# Patient Record
Sex: MALE | Race: WHITE | NOT HISPANIC OR LATINO | Employment: PART TIME | URBAN - METROPOLITAN AREA
[De-identification: names, ages, dates, MRNs, and addresses within clinical notes are randomized per-mention and may not be internally consistent; named-entity substitution may affect disease eponyms.]

---

## 2018-09-17 ENCOUNTER — OFFICE VISIT - RIVER FALLS (OUTPATIENT)
Dept: FAMILY MEDICINE | Facility: CLINIC | Age: 53
End: 2018-09-17

## 2018-09-17 ASSESSMENT — MIFFLIN-ST. JEOR: SCORE: 2117.22

## 2018-09-18 LAB
CHOLEST SERPL-MCNC: 219 MG/DL
CHOLEST/HDLC SERPL: 4.7 {RATIO}
CREAT SERPL-MCNC: 0.95 MG/DL (ref 0.7–1.33)
GLUCOSE BLD-MCNC: 106 MG/DL (ref 65–99)
HBA1C MFR BLD: 5 %
HDLC SERPL-MCNC: 47 MG/DL
LDLC SERPL CALC-MCNC: 141 MG/DL
NONHDLC SERPL-MCNC: 172 MG/DL
PSA SERPL-MCNC: 0.9 NG/ML
TRIGL SERPL-MCNC: 174 MG/DL

## 2018-11-08 ENCOUNTER — OFFICE VISIT - RIVER FALLS (OUTPATIENT)
Dept: FAMILY MEDICINE | Facility: CLINIC | Age: 53
End: 2018-11-08

## 2018-11-08 ASSESSMENT — MIFFLIN-ST. JEOR: SCORE: 2100.89

## 2019-12-30 ENCOUNTER — OFFICE VISIT - RIVER FALLS (OUTPATIENT)
Dept: FAMILY MEDICINE | Facility: CLINIC | Age: 54
End: 2019-12-30

## 2019-12-30 ASSESSMENT — MIFFLIN-ST. JEOR: SCORE: 2062.79

## 2019-12-31 ENCOUNTER — COMMUNICATION - RIVER FALLS (OUTPATIENT)
Dept: FAMILY MEDICINE | Facility: CLINIC | Age: 54
End: 2019-12-31

## 2019-12-31 LAB
CHOLEST SERPL-MCNC: 213 MG/DL
CHOLEST/HDLC SERPL: 3.6 {RATIO}
GLUCOSE BLD-MCNC: 71 MG/DL (ref 65–99)
HDLC SERPL-MCNC: 59 MG/DL
LDLC SERPL CALC-MCNC: 131 MG/DL
NONHDLC SERPL-MCNC: 154 MG/DL
TRIGL SERPL-MCNC: 121 MG/DL

## 2021-04-21 ENCOUNTER — AMBULATORY - RIVER FALLS (OUTPATIENT)
Dept: FAMILY MEDICINE | Facility: CLINIC | Age: 56
End: 2021-04-21

## 2021-05-19 ENCOUNTER — AMBULATORY - RIVER FALLS (OUTPATIENT)
Dept: FAMILY MEDICINE | Facility: CLINIC | Age: 56
End: 2021-05-19

## 2021-07-01 ENCOUNTER — OFFICE VISIT - RIVER FALLS (OUTPATIENT)
Dept: FAMILY MEDICINE | Facility: CLINIC | Age: 56
End: 2021-07-01

## 2021-07-01 ASSESSMENT — MIFFLIN-ST. JEOR: SCORE: 2165.76

## 2021-09-09 ENCOUNTER — OFFICE VISIT - RIVER FALLS (OUTPATIENT)
Dept: FAMILY MEDICINE | Facility: CLINIC | Age: 56
End: 2021-09-09

## 2021-09-09 ASSESSMENT — MIFFLIN-ST. JEOR: SCORE: 2149.88

## 2021-11-01 ENCOUNTER — OFFICE VISIT - RIVER FALLS (OUTPATIENT)
Dept: FAMILY MEDICINE | Facility: CLINIC | Age: 56
End: 2021-11-01

## 2021-11-01 ASSESSMENT — MIFFLIN-ST. JEOR: SCORE: 2163.49

## 2021-11-11 ENCOUNTER — OFFICE VISIT - RIVER FALLS (OUTPATIENT)
Dept: FAMILY MEDICINE | Facility: CLINIC | Age: 56
End: 2021-11-11

## 2021-11-11 ASSESSMENT — MIFFLIN-ST. JEOR: SCORE: 2171.66

## 2021-11-12 ENCOUNTER — COMMUNICATION - RIVER FALLS (OUTPATIENT)
Dept: FAMILY MEDICINE | Facility: CLINIC | Age: 56
End: 2021-11-12

## 2021-11-12 LAB
ERYTHROCYTE [SEDIMENTATION RATE] IN BLOOD BY WESTERGREN METHOD: 6 MM/H
URATE SERPL-MCNC: 6.1 MG/DL (ref 4–8)

## 2021-11-18 ENCOUNTER — AMBULATORY - RIVER FALLS (OUTPATIENT)
Dept: FAMILY MEDICINE | Facility: CLINIC | Age: 56
End: 2021-11-18

## 2022-02-11 VITALS
BODY MASS INDEX: 40.4 KG/M2 | HEART RATE: 72 BPM | SYSTOLIC BLOOD PRESSURE: 120 MMHG | HEIGHT: 69 IN | DIASTOLIC BLOOD PRESSURE: 70 MMHG | WEIGHT: 272.8 LBS | TEMPERATURE: 97.3 F

## 2022-02-11 VITALS
BODY MASS INDEX: 43.25 KG/M2 | SYSTOLIC BLOOD PRESSURE: 135 MMHG | WEIGHT: 292 LBS | HEART RATE: 60 BPM | DIASTOLIC BLOOD PRESSURE: 92 MMHG | BODY MASS INDEX: 43.69 KG/M2 | HEIGHT: 69 IN | SYSTOLIC BLOOD PRESSURE: 134 MMHG | TEMPERATURE: 97 F | HEIGHT: 69 IN | WEIGHT: 295 LBS | DIASTOLIC BLOOD PRESSURE: 89 MMHG | HEART RATE: 67 BPM

## 2022-02-11 VITALS
TEMPERATURE: 97.4 F | DIASTOLIC BLOOD PRESSURE: 88 MMHG | HEIGHT: 69 IN | HEIGHT: 69 IN | HEART RATE: 64 BPM | WEIGHT: 284.8 LBS | TEMPERATURE: 96.8 F | SYSTOLIC BLOOD PRESSURE: 122 MMHG | SYSTOLIC BLOOD PRESSURE: 136 MMHG | DIASTOLIC BLOOD PRESSURE: 88 MMHG | BODY MASS INDEX: 41.65 KG/M2 | HEART RATE: 76 BPM | WEIGHT: 281.2 LBS | BODY MASS INDEX: 42.18 KG/M2

## 2022-02-11 VITALS
SYSTOLIC BLOOD PRESSURE: 129 MMHG | HEIGHT: 69 IN | BODY MASS INDEX: 43.96 KG/M2 | HEART RATE: 68 BPM | TEMPERATURE: 96.9 F | WEIGHT: 296.8 LBS | DIASTOLIC BLOOD PRESSURE: 79 MMHG

## 2022-02-11 VITALS
SYSTOLIC BLOOD PRESSURE: 127 MMHG | HEIGHT: 69 IN | BODY MASS INDEX: 43.77 KG/M2 | HEART RATE: 60 BPM | WEIGHT: 295.5 LBS | DIASTOLIC BLOOD PRESSURE: 83 MMHG | OXYGEN SATURATION: 95 % | TEMPERATURE: 97.7 F

## 2022-02-16 NOTE — PROGRESS NOTES
Patient:   PRAVEEN GRACE            MRN: [id number]            FIN: [id number]               Age:   55 years     Sex:  Male     :  1965   Associated Diagnoses:   None   Author:   Michelle Braswell MA       -   Today's date:    2021.        -   To whom it may concern:        This patient Was seen in my office on  2021.  .  He/ she may return to work, without restrictions.  Please contact me if you have any questions or concerns.      -   Sincerely,   Dr. Varma    [address] [address]    [phone]

## 2022-02-16 NOTE — PROGRESS NOTES
Chief Complaint    annual px.  History of Present Illness      Patient is here for his annual physical.  The last time he was seen, he had an infection in his leg that was treated with Cephalexin, which has now resolved.  He does report having some back pain but attributes that from weight gain.  He had a colonoscopy last year and was found to have a polyp, needs surveillance colonoscopy in 5 years.  His last lipid panel done was in 9/17/18, with his cholesterol, LDL and triglycerides being elevated.  His 10 year risk of having a heart event is at 4.9%.  His mother passed away from MI, due to heavy smoking.  His father passed away from pancreatic cancer.  He does report of having bad eating habits and gaining weight since November.  His lowest weight since his last physical was at 259.  His weight today is 272.8.  He is fasting today.      Weight one year ago 281.2lbs.  Today s weight 272.8lbs.      Colon cancer screening status:  [facility]--due 2023      Last Dental Exam:  UTD      Last Eye Exam:  UTD      Immunizations:  UTD         Review of Systems      Constitutional: Negative.      Eye: Negative.      Ear/Nose/Mouth/Throat: Negative.      Respiratory: Negative.      Cardiovascular: Negative.      Gastrointestinal: Negative.      Genitourinary: Negative.      Hematology/Lymphatics: Negative.      Endocrine: Negative.      Immunologic: Negative.      Musculoskeletal: Negative.      Integumentary: Negative.      Neurologic: Negative.      Psychiatric: Negative.      All other systems reviewed and negative         Physical Exam   Vitals & Measurements    T: 97.3   F (Tympanic)  HR: 72(Peripheral)  BP: 120/70     HT: 69 in  WT: 272.8 lb  BMI: 40.28       General: Alert and oriented, No acute distress.      Eye: Pupils are equal, round and reactive to light, Extraocular movements are intact, Normal conjunctiva.      HENT: Normocephalic, Tympanic membranes are clear, Oral mucosa is moist, No pharyngeal erythema, No sinus  tenderness.      Neck: Supple, Non-tender, No carotid bruit, No lymphadenopathy, No thyromegaly.      Respiratory: Lungs are clear to auscultation, Respirations are non-labored, Breath sounds are equal, No chest wall tenderness.      Cardiovascular: Normal rate, Regular rhythm, No murmur, No gallop, Good pulses equal in all extremities, Normal peripheral perfusion, No edema.      Gastrointestinal: Soft, Non-tender, Non-distended, Normal bowel sounds, No organomegaly.      Genitourinary: No CVA tenderness      Lymphatics: WNL.      Musculoskeletal: Normal range of motion, Normal strength, No tenderness, No swelling, No deformity.      Integumentary: Warm, Dry, No rash.      Neurologic: Alert, Oriented, Normal sensory, Normal motor function, No focal deficits.      Psychiatric: Cooperative, Appropriate mood & affect.         Assessment/Plan       1. Encounter for annual physical exam (Z00.00)         Discussed improving diet, increasing exercise/activity level to 30 minutes daily, preventative services, immunizations.  RTC 1 year for annual physical.       2. Hyperlipidemia (E78.5)         Fasting lipid panel, glucose today.       3. MORBID OBESITY (E66.01)         Work on improving diet and increasing activity level.       Orders:         Glucose* ([facility]), Specimen Type: Serum, Collection Date: 12/30/19 15:26:00 CST         Lipid panel with reflex to direct ldl* ([facility]), Specimen Type: Serum, Collection Date: 12/30/19 15:26:00 CST         Return to Clinic (Request), RFV: Yearly exam/physical, Return in 1 year      IMichelle MA, acted solely as a scribe for, and in the presence of Dr. Topher Varma who performed the services.             I, Topher Varma MD, personally performed the services described in this documentation.  The documentation was scribed in my presence and is both accurate and complete.                Patient Information     Name:PRAVEEN GRACE      Address:       [place] [address]       [place], [place] [id number]     Sex:Male     YOB: 1965     [phone]     Emergency Contact:DECLINED, UNKNOWN     MRN:[id number]     FIN:[id number]     Location:[facility]     Date of Service:12/30/2019      Primary Care Physician:       NONE ,       Attending Physician:       Topher Varma MD, [phone]  Problem List/Past Medical History    Ongoing     Asthma     Hyperlipidemia     MORBID OBESITY     Sleep apnea    Historical     No qualifying data  Procedure/Surgical History     Flexible sigmoidoscopy (05/02/2000)        Medications    ZyrTEC 10 mg oral tablet, 10 mg= 1 tab(s), Oral, daily  Allergies    No known allergies  Social History    Smoking Status - 12/30/2019     Never smoker     Employment/School      Employed, 05/28/2013     Home/Environment      Marital status: ., 05/28/2013     Tobacco      Never smoker, 06/20/2016  Family History    Cancer: Father.    Chronic obstructive pulmonary disease: Mother and Father.    Tobacco user: Mother.  Immunizations      Vaccine Date Status          influenza virus vaccine, inactivated 11/19/2019 Recorded          influenza virus vaccine, inactivated 10/25/2018 Recorded          influenza 12/30/2017 Recorded          influenza virus vaccine, inactivated 10/16/2014 Recorded          tetanus/diphth/pertuss (Tdap) adult/adol 10/14/2013 Given          MMR (measles/mumps/rubella) 10/19/2002 Recorded          Td 03/24/2000 Recorded

## 2022-02-16 NOTE — LETTER
(Inserted Image. Unable to display)   September 18, 2019        PRAVEEN GRACE   [address] [place], [place] [id number]        Dear PRAVEEN,      Thank you for selecting [facility] (previously [facility]) for your healthcare needs.    Our records indicate you are due for the following services:     Annual Physical    To schedule an appointment or if you have further questions, please contact your primary clinic:   [facility]       [phone]   [facility]       [phone]              [facility]     [phone]      Powered by [employer]    Sincerely,    Topher Varma M.D.

## 2022-02-16 NOTE — LETTER
(Inserted Image. Unable to display)   December 31, 2019        PRAVEEN GRACE       [address] [place], [place] [id number]        Dear PRAVEEN,    Thank you for selecting [facility] for your health care needs.  Below you will find the results of your recent test(s) done at our clinic.     Your tests look good.  A heart healthy diet, increased activity and weight loss should improve the LDL.      Result Name Current Result Previous Result Reference Range   Glucose Level (mg/dL)  71 12/30/2019 ((H)) 106 9/17/2018 65 - 99   Cholesterol (mg/dL) ((H)) 213 12/30/2019 ((H)) 219 9/17/2018  - <200   Non-HDL Cholesterol ((H)) 154 12/30/2019 ((H)) 172 9/17/2018  - <130   HDL (mg/dL)  59 12/30/2019  47 9/17/2018 >40 -    Cholesterol/HDL Ratio  3.6 12/30/2019  4.7 9/17/2018  - <5.0   LDL ((H)) 131 12/30/2019 ((H)) 141 9/17/2018    Triglyceride (mg/dL)  121 12/30/2019 ((H)) 174 9/17/2018  - <150       Please contact me or my assistant at [phone] if you have any questions about your results.    Sincerely,        Salty Varma MD        What do your labs mean?  Below is a glossary of commonly ordered labs:  LDL   Bad Cholesterol   HDL   Good Cholesterol  AST/ALT   Liver Function   Cr/Creatinine   Kidney Function  Microalbumin   Kidney Function  BUN   Kidney Function  PSA   Prostate    TSH   Thyroid Hormone  HgbA1c   Diabetes Test   Hgb (Hemoglobin)   Red Blood Cells

## 2022-02-16 NOTE — NURSING NOTE
Vitals/Measurements Entered On:  11/1/2021 3:47 PM CDT    Performed On:  11/1/2021 3:47 PM CDT by Mara Thomson LPN               Vital Signs   Systolic Blood Pressure :   134 mmHg (HI)    Diastolic Blood Pressure :   89 mmHg (HI)    Mean Arterial Pressure :   104 mmHg   BP Site :   Right arm   Mara Thomson LPN - 11/1/2021 3:47 PM CDT

## 2022-02-16 NOTE — NURSING NOTE
Comprehensive Intake Entered On:  11/1/2021 3:13 PM CDT    Performed On:  11/1/2021 3:06 PM CDT by Mara Thomson LPN               Summary   Chief Complaint :   follow up right foot pain, did have some improvement after treatment. check lesion on left hand   Weight Measured :   295 lb(Converted to: 295 lb 0 oz, 133.810 kg)    Height Measured :   69 in(Converted to: 5 ft 9 in, 175.26 cm)    Body Mass Index :   43.56 kg/m2 (HI)    Body Surface Area :   2.55 m2   Systolic Blood Pressure :   152 mmHg (HI)    Diastolic Blood Pressure :   92 mmHg (HI)    Mean Arterial Pressure :   112 mmHg   Peripheral Pulse Rate :   60 bpm   BP Site :   Right arm   Pulse Site :   Radial artery   BP Method :   Electronic   HR Method :   Electronic   Languages :   English   Mara Thomson LPN - 11/1/2021 3:06 PM CDT   Health Status   Allergies Verified? :   Yes   Medication History Verified? :   Yes   Pre-Visit Planning Status :   Completed   Tobacco Use? :   Never smoker   Mara Thomson LPN - 11/1/2021 3:06 PM CDT   Consents   Consent for Immunization Exchange :   Consent Granted   Consent for Immunizations to Providers :   Consent Granted   Mara Thomson LPN - 11/1/2021 3:06 PM CDT   Meds / Allergies   (As Of: 11/1/2021 3:13:48 PM CDT)   Allergies (Active)   No known allergies  Estimated Onset Date:   Unspecified ; Created By:   Mally Perkins CMA; Reaction Status:   Active ; Category:   Drug ; Substance:   No known allergies ; Type:   Allergy ; Updated By:   Mally Perkins CMA; Source:   Paper Chart ; Reviewed Date:   11/1/2021 3:12 PM CDT        Medication List   (As Of: 11/1/2021 3:13:48 PM CDT)   Prescription/Discharge Order    tadalafil  :   tadalafil ; Status:   Prescribed ; Ordered As Mnemonic:   tadalafil 20 mg oral tablet ; Simple Display Line:   20 mg, 1 tab(s), Oral, As Directed, 5 tab(s), 0 Refill(s) ; Ordering Provider:   Topher Varma MD; Catalog Code:   tadalafil ; Order Dt/Tm:    9/9/2021 3:36:10 PM CDT            Home Meds    cetirizine  :   cetirizine ; Status:   Documented ; Ordered As Mnemonic:   ZyrTEC 10 mg oral tablet ; Simple Display Line:   10 mg, 1 tab(s), Oral, daily, 0 Refill(s) ; Catalog Code:   cetirizine ; Order Dt/Tm:   12/30/2019 3:03:18 PM CST

## 2022-02-16 NOTE — PROGRESS NOTES
Patient:   PRAVEEN GRACE            MRN: [id number]            FIN: [id number]               Age:   55 years     Sex:  Male     :  1965   Associated Diagnoses:   None   Author:   Michelle Braswell MA       -   Today's date:    2021.        -   To whom it may concern:        This patient Was seen in my office on  2021.  .     Please excuse him/ her from work, today, due to injury.  He/ she may return to work, on  2021, or may return to work 2021, depending severity of his leg pain.  Please contact me if you have any questions or concerns.      -   Sincerely,   Dr. Varma    [facility] [address]    [phone]

## 2022-02-16 NOTE — PROGRESS NOTES
Chief Complaint    c/o left ankle pain for past couple weeks.  denies injury  History of Present Illness       Patient here with 10 days of intermittent left ankle pain. He denies injury. Pain has become more persistent in the last 48 h. He denies fever, chills, sweats. Is also noticed his foot is a bit swollen.  Review of Systems       See HPI.  All other review of systems negative.  Physical Exam   Vitals & Measurements    T: 96.9  F (Tympanic)  HR: 68 (Peripheral)  BP: 129/79     HT: 69 in  WT: 296.8 lb  BMI: 43.82        Alert, oriented, no acute distress       Normal heart rate       Nonlabored breathing       Exam of the left ankle reveals mild warmth, mild swelling and redness. He has decreased range of motion due to pain.  Assessment/Plan       1. Left ankle pain (M25.572)          Left ankle pain suspicious for gout although it is not as warm and red as well expect.         Treat his inflammatory arthritis with prednisone and obtain ESR and uric acid.         Ordered:          predniSONE, = 2 tab(s) ( 40 mg ), Oral, daily, x 7 day(s), # 14 tab(s), 0 Refill(s), Type: Acute, Pharmacy: [id number], 2 tab(s) Oral daily,x7 day(s), 69, in, 11/11/21 12:07:00 CST, Height Measured, 296.8, lb, 11/11/21 12:07:00 CST, Weight Measured, (Ordered)          Sed rate by modified westergren* ([facility]), Specimen Type: Blood, Collection Date: 11/11/21 12:36:00 CST          Uric Acid* ([facility]), Specimen Type: Serum, Collection Date: 11/11/21 12:36:00 CST           Patient Information     Name:PRAVEEN GRACE      Address:      [address] [id number]     Sex:Male     YOB: 1965     [phone]     Emergency Contact:NANCIE CONNOR     MRN:[id number]     FIN:[id number]     Location:[facility]     Date of Service:11/11/2021      Primary Care Physician:       Topher Varma MD, [phone]      Attending Physician:       Topher Varma MD, [phone]  Problem  List/Past Medical History    Ongoing     Asthma     Hyperlipidemia     MORBID OBESITY     Sleep apnea    Historical     No qualifying data  Procedure/Surgical History     Flexible sigmoidoscopy (05/02/2000)  Medications    predniSONE 20 mg oral tablet, 40 mg= 2 tab(s), Oral, daily    tadalafil 20 mg oral tablet, 20 mg= 1 tab(s), Oral, As Directed, 3 refills    ZyrTEC 10 mg oral tablet, 10 mg= 1 tab(s), Oral, daily  Allergies    No known allergies  Social History    Smoking Status     Never smoker     Alcohol      Current, Wine (5 oz), 1-2 times per month     Electronic Cigarette/Vaping      Electronic Cigarette Use: Never.     Employment/School      Employed, Work/School description: Sales.     Exercise      Exercise frequency: 1-2 times/week. Exercise type: Walking.     Home/Environment      Marital status: .     Nutrition/Health      Type of diet: Regular. Wants to lose weight: Yes.     Substance Abuse      Never     Tobacco      Never (less than 100 in lifetime)  Family History    Cancer: Father.    Chronic obstructive pulmonary disease: Mother and Father.    Tobacco user: Mother.  Immunizations          Scheduled Immunizations          Dose Date(s)          influenza          10/14/2010, 09/16/2011, 10/17/2012          influenza virus vaccine, inactivated          10/16/2014, 10/25/2018, 11/19/2019, 09/15/2020, 10/18/2021          Other Immunizations          influenza          12/30/2017          MMR (measles/mumps/rubella)          10/19/2002          Td          03/24/2000          tetanus/diphth/pertuss (Tdap) adult/adol          10/14/2013          SARS-CoV-2 (COVID-19) Moderna-1273          04/21/2021, 05/19/2021

## 2022-02-16 NOTE — TELEPHONE ENCOUNTER
"---------------------  From: Eva Apple CMA   Sent: 7/1/2021 10:20:33 AM CDT  Subject: leg injury     Pt calling at 1015.    Playing softball last night and injured leg while running. Swollen by knee. Difficulty moving around and is \"hobbling\". Ibuprofen at bedtime, has not iced today yet. Injury to leg 4 years ago - fell through attic floor into basement. Advised appt in clinic and pt agreed - transferred to scheduling.  "

## 2022-02-16 NOTE — NURSING NOTE
Comprehensive Intake Entered On:  12/30/2019 3:05 PM CST    Performed On:  12/30/2019 2:58 PM CST by French ARAUJO, Michelle               Summary   Chief Complaint :   annual px.   Weight Measured :   272.8 lb(Converted to: 272 lb 13 oz, 123.74 kg)    Height Measured :   69 in(Converted to: 5 ft 9 in, 175.26 cm)    Body Mass Index :   40.28 kg/m2 (HI)    Body Surface Area :   2.45 m2   Systolic Blood Pressure :   120 mmHg   Diastolic Blood Pressure :   70 mmHg   Mean Arterial Pressure :   87 mmHg   Peripheral Pulse Rate :   72 bpm   BP Site :   Right arm   Pulse Site :   Radial artery   BP Method :   Manual   HR Method :   Manual   Temperature Tympanic :   97.3 DegF(Converted to: 36.3 DegC)  (LOW)    Languages :   English   Michelle Braswell MA - 12/30/2019 2:58 PM CST   Health Status   Allergies Verified? :   Yes   Medication History Verified? :   Yes   Medical History Verified? :   Yes   Pre-Visit Planning Status :   Completed   Tobacco Use? :   Never smoker   Michelle Braswell MA - 12/30/2019 2:58 PM CST   Consents   Consent for Immunization Exchange :   Consent Granted   Consent for Immunizations to Providers :   Consent Granted   Michelle Braswell MA - 12/30/2019 2:58 PM CST   Meds / Allergies   (As Of: 12/30/2019 3:05:34 PM CST)   Allergies (Active)   No known allergies  Estimated Onset Date:   Unspecified ; Created By:   Mally Giang; Reaction Status:   Active ; Category:   Drug ; Substance:   No known allergies ; Type:   Allergy ; Updated By:   Mally Giang; Source:   Paper Chart ; Reviewed Date:   11/8/2018 1:35 PM CST        Medication List   (As Of: 12/30/2019 3:05:34 PM CST)   Prescription/Discharge Order    cephalexin  :   cephalexin ; Status:   Completed ; Ordered As Mnemonic:   cephalexin 500 mg oral tablet ; Simple Display Line:   1,000 mg, 2 tab(s), PO, bid, for 7 day(s), 28 tab(s), 0 Refill(s) ; Ordering Provider:   Topher Varma MD; Catalog Code:   cephalexin ; Order Dt/Tm:    11/8/2018 2:15:40 PM CST            Home Meds    cetirizine  :   cetirizine ; Status:   Documented ; Ordered As Mnemonic:   ZyrTEC 10 mg oral tablet ; Simple Display Line:   10 mg, 1 tab(s), Oral, daily, 0 Refill(s) ; Catalog Code:   cetirizine ; Order Dt/Tm:   12/30/2019 3:03:18 PM CST            Social History   Social History   (As Of: 12/30/2019 3:05:34 PM CST)   Tobacco:        Never smoker   (Last Updated: 6/20/2016 11:24:06 AM CDT by Gavin Shaw CMA)          Employment/School:        Employed   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   (Last Updated: 5/28/2013 11:00:14 AM CDT by Yenny Stevens)

## 2022-02-16 NOTE — PROGRESS NOTES
Chief Complaint    c/o outer right foot pain x2mos, no relief w/ soaking, is on feet all day long.  also c/o left knee pain--ongoing  History of Present Illness       Patient is here with a painful area       Is right lateral distal foot.  This is in the plantar surface.  He denies any injury.  This is painful when he walks.  He also has continued discomfort in his left lateral knee.  Earlier in the summer he was diagnosed with a strain of the biceps femoris.  Rest and activity restrictions advised.  He noted the improvement in the discomfort but continues to have some nagging pain.  Review of Systems       See HPI.  All other review of systems negative.  Physical Exam   Vitals & Measurements    T: 97  F (Tympanic)  HR: 67 (Peripheral)  BP: 135/92     HT: 69 in  WT: 292 lb  BMI: 43.12        Alert, oriented, no acute distress       Normal heart rate       Nonlabored breathing       Tenderness at the insertion of the left biceps femoris tendon, normal knee range of motion, normal strength       2 mm coring type lesion present under the right fifth metatarsal head  Assessment/Plan       1. Corn of foot (L84)          Advise appropriate shoe wear, metatarsal pad, and pressure relief.  There was cleansed with alcohol and corn trimmed and treated with liquid nitrogen.  He will follow-up as needed       2. Hamstring tendinitis (M76.899)          Continued trouble with hamstring tendon.  There are discussed strengthening and stretching exercises.  If there is no improvement over the next few weeks reconsider physical therapy.       Orders:         tadalafil, = 1 tab(s) ( 20 mg ), Oral, As Directed, # 5 tab(s), 0 Refill(s), Type: Maintenance, (Ordered)  Patient Information     Name:PRAVEEN GRACE      Address:      [address] [id number]     Sex:Male     YOB: 1965     [phone]     Emergency Contact:DECLINED, UNKNOWN     MRN:[id number]     FIN:[id number]     Location:[facility]  [place]     Date of Service:09/09/2021      Primary Care Physician:       NONE ,       Attending Physician:       Topher Varma MD, [phone]  Problem List/Past Medical History    Ongoing     Asthma     Hyperlipidemia     MORBID OBESITY     Sleep apnea    Historical     No qualifying data  Procedure/Surgical History     Flexible sigmoidoscopy (05/02/2000)  Medications    tadalafil 20 mg oral tablet, 20 mg= 1 tab(s), Oral, As Directed    ZyrTEC 10 mg oral tablet, 10 mg= 1 tab(s), Oral, daily  Allergies    No known allergies  Social History    Smoking Status     Never smoker     Alcohol      Current, Wine (5 oz), 1-2 times per month     Electronic Cigarette/Vaping      Electronic Cigarette Use: Never.     Employment/School      Employed, Work/School description: Sales.     Exercise      Exercise frequency: 1-2 times/week. Exercise type: Walking.     Home/Environment      Marital status: .     Nutrition/Health      Type of diet: Regular. Wants to lose weight: Yes.     Substance Abuse      Never     Tobacco      Never (less than 100 in lifetime)  Family History    Cancer: Father.    Chronic obstructive pulmonary disease: Mother and Father.    Tobacco user: Mother.  Immunizations          Scheduled Immunizations          Dose Date(s)          influenza virus vaccine, inactivated          10/16/2014, 10/25/2018, 11/19/2019, 09/15/2020          Other Immunizations          influenza          12/30/2017          MMR (measles/mumps/rubella)          10/19/2002          Td          03/24/2000          tetanus/diphth/pertuss (Tdap) adult/adol          10/14/2013          SARS-CoV-2 (COVID-19) Moderna-1273          04/21/2021, 05/19/2021

## 2022-02-16 NOTE — PROGRESS NOTES
Chief Complaint        establish care, discuss colonoscopy, fasting labs. is fasting.      History of Present Illness          Patient is here to establish care and to discuss preventative care maintenance.  He is fasting and would like to have fasting labs done as well as discuss getting set up for a colonoscopy/CRC screening.  He has been seeing the surgeon for an ongoing leg infection received from a dennys accident for the past couple of months and received a reminder for preventative care services that are due on his visit summary from [facility].  He did have drains placed, which have since been removed.  He doesn't take any regular medications.   He does have past history of asthma and sleep apnea.  His asthma has improved and he no longer uses his CPAP machine but uses nasal strips, which seems to be helping.  Denies daytime sleepiness.  Dental visits UTD, immunizations UTD.      Review of Systems          Constitutional: Negative, obese.          Eye: Negative.          Ear/Nose/Mouth/Throat: Negative.          Respiratory: Negative.          Cardiovascular: Negative.          Gastrointestinal: Negative.          Genitourinary: Negative.          Hematology/Lymphatics: Negative.          Endocrine: Negative.          Immunologic: Negative.          Musculoskeletal: Wounds to left leg.          Integumentary: Negative.          Neurologic: Negative.          Psychiatric: Negative.          All other systems reviewed and negative                 Physical Exam       Vitals & Measurements        T: 97.4   F (Tympanic)  HR: 76(Peripheral)  BP: 138/90         HT: 69 in  WT: 284.8 lb  BMI: 42.05           General: Alert and oriented, No acute distress.          Eye: Pupils are equal, round and reactive to light, Extraocular movements are intact, Normal conjunctiva.          HENT: Normocephalic, Tympanic membranes are clear, Oral mucosa is moist, No pharyngeal erythema, No sinus tenderness.          Neck: Supple,  Non-tender, No carotid bruit, No lymphadenopathy, No thyromegaly.          Respiratory: Lungs are clear to auscultation, Respirations are non-labored, Breath sounds are equal, No chest wall tenderness.          Cardiovascular: Normal rate, Regular rhythm, No murmur, No gallop, Good pulses equal in all extremities, Normal peripheral perfusion, No edema.          Gastrointestinal: Soft, Non-tender, Non-distended, Normal bowel sounds, No organomegaly.          Musculoskeletal: Normal range of motion, Normal strength, No tenderness, No swelling, No deformity.  Healing left leg wounds.          Integumentary: Warm, Dry, No rash.          Neurologic: Alert, Oriented, Normal sensory, Normal motor function, No focal deficits.          Psychiatric: Cooperative, Appropriate mood & affect.                       EKG findings           Rhythm: heart rate  60  beats/min, sinus normal.             Axis: normal axis, normal configuration.             Intervals: normal.             P waves: normal.             QRS complex: normal.             ST-T-U complex: normal.             Interpretation: normal EKG.            [1]      Assessment/Plan           1. Screening for colon cancer (Z12.11)             Discussed CRC screening/colonoscopy.  He will need to have a surgeon do the colonoscopy due to co-morbalities that the pt has--BMI>42, hx of KATE.  ASA form filled out.  An EKG will be done prior to pt going to lab.           2. Encounter for screening and preventative care (Z00.00)            Continue to lose weight and improve diet.  Patient sent to lab for BMP, hgb a1c, lipid panel and PSA.  Will send out results when available.  He will RTC in 1yr for routine exam, sooner depending on results.            Michelle AHUMADA MA, acted solely as a scribe for, and in the presence of Dr. Topher Varma who performed the services.                         Topher AHUMADA MD, personally performed the services described in this  documentation.  The documentation was scribed in my presence and is both accurate and complete.      Patient Information         Name:PRAVEEN GRACE          Address:          [address] [address]         Sex:Male         YOB: 1965         [phone]         Emergency Contact:DECLINED, UNKNOWN         MRN:[id number]         FIN:[id number]         Location:[facility]         Date of Service:09/17/2018          Primary Care Physician:           Benny Vargas PA-C, [phone]          Attending Physician:           Topher Varma MD, [phone]      Problem List/Past Medical History        Ongoing         Asthma         Hyperlipidemia         MORBID OBESITY         Sleep apnea        Historical         No qualifying data      Procedure/Surgical History         Flexible sigmoidoscopy (05/02/2000)                Medications         No Recorded Medications              Allergies        No known allergies      Social History        Smoking Status - 09/17/2018         Never smoker         Employment and Education          Employed, 05/28/2013         Home and Environment          Marital status: ., 05/28/2013         Tobacco          Never smoker, 06/20/2016      Family History        Chronic obstructive pulmonary disease: Mother and Father.      Immunizations          Vaccine Date Status          influenza 12/30/2017 Recorded          tetanus/diphth/pertuss (Tdap) adult/adol 10/14/2013 Given          MMR (measles/mumps/rubella) 10/19/2002 Recorded          Td 03/24/2000 Recorded  [1] EKG Procedure *; Topher Varma MD 09/17/2018 09:38 CDT

## 2022-02-16 NOTE — LETTER
(Inserted Image. Unable to display)   [address] [place]  [address]  November 12, 2021                      PRAVEEN GRACE       [address] [place] [address]        Dear PRAVEEN,    Thank you for selecting [facility] for your health care needs.  Below you will find the results of your recent test(s) done at our clinic.     Your tests were normal.  It does not look like gout.      Result Name Current Result Reference Range   Uric Acid (mg/dL)  6.1 11/11/2021 4.0 - 8.0   Sed Rate  6 11/11/2021  - < OR = 20       Please contact me or my assistant at [phone] if you have any questions about your results.    Sincerely,        Salty Varma MD        What do your labs mean?  Below is a glossary of commonly ordered labs:  LDL   Bad Cholesterol   HDL   Good Cholesterol  AST/ALT   Liver Function   Cr/Creatinine   Kidney Function  Microalbumin   Kidney Function  BUN   Kidney Function  PSA   Prostate    TSH   Thyroid Hormone  HgbA1c   Diabetes Test   Hgb (Hemoglobin)   Red Blood Cells

## 2022-02-16 NOTE — NURSING NOTE
Comprehensive Intake Entered On:  9/9/2021 2:53 PM CDT    Performed On:  9/9/2021 2:48 PM CDT by French ARAUJO, Michelle               Summary   Chief Complaint :   c/o outer right foot pain x2mos, no relief w/ soaking, is on feet all day long.  also c/o left knee pain--ongoing   Weight Measured :   292 lb(Converted to: 292 lb 0 oz, 132.449 kg)    Height Measured :   69 in(Converted to: 5 ft 9 in, 175.26 cm)    Body Mass Index :   43.12 kg/m2 (HI)    Body Surface Area :   2.54 m2   Systolic Blood Pressure :   135 mmHg (HI)    Diastolic Blood Pressure :   92 mmHg (HI)    Mean Arterial Pressure :   106 mmHg   Peripheral Pulse Rate :   67 bpm   BP Site :   Right arm   Pulse Site :   Radial artery   BP Method :   Electronic   HR Method :   Electronic   Temperature Tympanic :   97 DegF(Converted to: 36.1 DegC)  (LOW)    Languages :   English   Michelle Braswell MA - 9/9/2021 2:48 PM CDT   Health Status   Allergies Verified? :   Yes   Medication History Verified? :   Yes   Medical History Verified? :   Yes   Pre-Visit Planning Status :   Completed   Tobacco Use? :   Never smoker   Michelle Braswell MA - 9/9/2021 2:48 PM CDT   Consents   Consent for Immunization Exchange :   Consent Granted   Consent for Immunizations to Providers :   Consent Granted   Michelle Braswell MA - 9/9/2021 2:48 PM CDT   Meds / Allergies   (As Of: 9/9/2021 2:53:26 PM CDT)   Allergies (Active)   No known allergies  Estimated Onset Date:   Unspecified ; Created By:   Mally Perkins CMA; Reaction Status:   Active ; Category:   Drug ; Substance:   No known allergies ; Type:   Allergy ; Updated By:   Mally Perkins CMA; Source:   Paper Chart ; Reviewed Date:   11/8/2018 1:35 PM CST        Medication List   (As Of: 9/9/2021 2:53:26 PM CDT)   Home Meds    cetirizine  :   cetirizine ; Status:   Documented ; Ordered As Mnemonic:   ZyrTEC 10 mg oral tablet ; Simple Display Line:   10 mg, 1 tab(s), Oral, daily, 0 Refill(s) ; Catalog Code:   cetirizine ;  Order Dt/Tm:   12/30/2019 3:03:18 PM CST            Social History   Social History   (As Of: 9/9/2021 2:53:26 PM CDT)   Alcohol:        Current, Wine (5 oz), 1-2 times per month   (Last Updated: 12/30/2019 5:22:00 PM CST by Michelle Braswell MA)          Tobacco:        Never (less than 100 in lifetime)   (Last Updated: 7/1/2021 4:31:48 PM CDT by Michelle Braswell MA)          Electronic Cigarette/Vaping:        Electronic Cigarette Use: Never.   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   (Last Updated: 5/28/2013 11:00:14 AM CDT by Yenny Stevens)          Nutrition/Health:        Type of diet: Regular.  Wants to lose weight: Yes.   (Last Updated: 12/30/2019 5:22:53 PM CST by Michelle Braswell MA)          Exercise:        Exercise frequency: 1-2 times/week.  Exercise type: Walking.   (Last Updated: 12/30/2019 5:23:06 PM CST by Michelle Braswell MA)

## 2022-02-16 NOTE — NURSING NOTE
Comprehensive Intake Entered On:  7/1/2021 4:39 PM CDT    Performed On:  7/1/2021 4:36 PM CDT by French ARAUJO, Michelle               Summary   Chief Complaint :   c/o LLE injury last evening while playing softball.   c/o increased swelling/redness.   Weight Measured :   295.5 lb(Converted to: 295 lb 8 oz, 134.037 kg)    Height Measured :   69 in(Converted to: 5 ft 9 in, 175.26 cm)    Body Mass Index :   43.63 kg/m2 (HI)    Body Surface Area :   2.55 m2   Systolic Blood Pressure :   127 mmHg   Diastolic Blood Pressure :   83 mmHg (HI)    Mean Arterial Pressure :   98 mmHg   Peripheral Pulse Rate :   60 bpm   BP Site :   Right arm   Pulse Site :   Radial artery   BP Method :   Electronic   HR Method :   Electronic   Temperature Tympanic :   97.7 DegF(Converted to: 36.5 DegC)  (LOW)    Oxygen Saturation :   95 %   Languages :   English   Michelle Braswell MA - 7/1/2021 4:36 PM CDT   Health Status   Allergies Verified? :   Yes   Medication History Verified? :   Yes   Medical History Verified? :   Yes   Pre-Visit Planning Status :   Not completed   Tobacco Use? :   Never smoker   Michelle Braswell MA - 7/1/2021 4:36 PM CDT   Consents   Consent for Immunization Exchange :   Consent Granted   Consent for Immunizations to Providers :   Consent Granted   Michelle Braswell MA - 7/1/2021 4:36 PM CDT   Meds / Allergies   (As Of: 7/1/2021 4:39:24 PM CDT)   Allergies (Active)   No known allergies  Estimated Onset Date:   Unspecified ; Created By:   Mally Perkins CMA; Reaction Status:   Active ; Category:   Drug ; Substance:   No known allergies ; Type:   Allergy ; Updated By:   Mally Perkins CMA; Source:   Paper Chart ; Reviewed Date:   11/8/2018 1:35 PM CST        Medication List   (As Of: 7/1/2021 4:39:24 PM CDT)   Home Meds    cetirizine  :   cetirizine ; Status:   Documented ; Ordered As Mnemonic:   ZyrTEC 10 mg oral tablet ; Simple Display Line:   10 mg, 1 tab(s), Oral, daily, 0 Refill(s) ; Catalog Code:   cetirizine ;  Order Dt/Tm:   12/30/2019 3:03:18 PM CST

## 2022-02-16 NOTE — PROGRESS NOTES
"Chief Complaint    c/o left leg injury over the summer, had surgery x2. was treated for possible infection. now c/o increased pain for the past 2-3 days, increased swelling, warm to touch.  History of Present Illness      Chief complaint reviewed and confirmed with the patient.      Left leg has been at the same size as right but swelling has increased and painful. Two days ago was swollen more then now.  Review of Systems      \"See HPI.  All other review of systems negative.\"  Physical Exam   Vitals & Measurements    T: 96.8   F (Tympanic)  HR: 64(Peripheral)  BP: 136/88     HT: 69 in  WT: 281.2 lb  BMI: 41.52           General:  Alert and oriented, No acute distress.            Eye:  Normal conjunctiva.            HENT:  Oral mucosa is moist.            Neck:  Supple.            Respiratory:  Respirations are non-labored.            Cardiovascular:  Normal rate, Regular rhythm, edema in left lower extremity,           Gastrointestinal:  Non-distended.            Musculoskeletal:  Normal gait.            Integumentary:  Warm, No rash.            Psychiatric:  Cooperative, Appropriate mood & affect, Normal judgment.             Assessment/Plan       1. Cellulitis (L03.90)         Antibiotic hard copy printed for patient, if swelling continues or erythema and tenderness worsens over next 48 hours fill antibiotics. Follow up if symptoms worsen or do not improve         Orders:          cephalexin, = 2 tab(s) ( 1,000 mg ), PO, bid, # 28 tab(s), 0 Refill(s), Type: Maintenance, (Ordered)               IMraa LPN, acted solely as a scribe for, and in the presence of Dr. Topher Varma who performed the services.  Patient Information     Name:PRAVEEN GRACE      Address:      [address] [address]     Sex:Male     YOB: 1965     [phone]     Emergency Contact:DECLINED, UNKNOWN     MRN:[id number]     FIN:[id number]     Location[facility]" Date of Service:11/08/2018      Primary Care Physician:       Benny Vargas PA-C, [phone]      Attending Physician:       Topher Varma MD, [phone]  Problem List/Past Medical History    Ongoing     Asthma     Hyperlipidemia     MORBID OBESITY     Sleep apnea    Historical     No qualifying data  Procedure/Surgical History     Flexible sigmoidoscopy (05/02/2000)        Medications     cephalexin 500 mg oral tablet: 1,000 mg, 2 tab(s), PO, bid, for 7 day(s), 28 tab(s), 0 Refill(s).          Allergies    No known allergies  Social History    Smoking Status - 11/08/2018     Never smoker     Employment and Education      Employed, 05/28/2013     Home and Environment      Marital status: ., 05/28/2013     Tobacco      Never smoker, 06/20/2016  Family History    Chronic obstructive pulmonary disease: Mother and Father.  Immunizations      Vaccine Date Status      influenza 12/30/2017 Recorded      tetanus/diphth/pertuss (Tdap) adult/adol 10/14/2013 Given      MMR (measles/mumps/rubella) 10/19/2002 Recorded      Td 03/24/2000 Recorded  Lab Results       Lab Results (Last 4 results within 90 days)        Sodium Level: 140 mmol/L [135 mmol/L - 146 mmol/L] (09/17/18 10:07:00 CDT)       Potassium Level: 4.8 mmol/L [3.5 mmol/L - 5.3 mmol/L] (09/17/18 10:07:00 CDT)       Chloride Level: 104 mmol/L [98 mmol/L - 110 mmol/L] (09/17/18 10:07:00 CDT)       CO2 Level: 30 mmol/L [20 mmol/L - 32 mmol/L] (09/17/18 10:07:00 CDT)       Glucose Level: 106 mg/dL High [65 mg/dL - 99 mg/dL] (09/17/18 10:07:00 CDT)       BUN: 14 mg/dL [7 mg/dL - 25 mg/dL] (09/17/18 10:07:00 CDT)       Creatinine Level: 0.95 mg/dL [0.7 mg/dL - 1.33 mg/dL] (09/17/18 10:07:00 CDT)       BUN/Creat Ratio: NOT APPLICABLE [6  - 22] (09/17/18 10:07:00 CDT)       eGFR: 92 mL/min/1.73m2 [8.6 mg/dL - 10.3 mg/dL] (09/17/18 10:07:00 CDT)       eGFR African American: 107 mL/min/1.73m2 [6  - 22] (09/17/18 10:07:00 CDT)       Calcium Level: 9.6 mg/dL  [8.6 mg/dL - 10.3 mg/dL] (09/17/18 10:07:00 CDT)       Hgb A1c: 5 [65 mg/dL - 99 mg/dL] (09/17/18 10:07:00 CDT)       Cholesterol: 219 mg/dL High [8.6 mg/dL - 10.3 mg/dL] (09/17/18 10:07:00 CDT)       Non-HDL Cholesterol: 172 High [20 mmol/L - 32 mmol/L] (09/17/18 10:07:00 CDT)       HDL: 47 mg/dL [65 mg/dL - 99 mg/dL] (09/17/18 10:07:00 CDT)       Cholesterol/HDL Ratio: 4.7 [0.7 mg/dL - 1.33 mg/dL] (09/17/18 10:07:00 CDT)       LDL: 141 High [0.7 mg/dL - 1.33 mg/dL] (09/17/18 10:07:00 CDT)       Triglyceride: 174 mg/dL High [8.6 mg/dL - 10.3 mg/dL] (09/17/18 10:07:00 CDT)       PSA: 0.9 ng/mL [3.5 mmol/L - 5.3 mmol/L] (09/17/18 10:07:00 CDT)

## 2022-02-16 NOTE — TELEPHONE ENCOUNTER
---------------------  From: Shabnam Dorsey CMA   To: [employer] [facility] ([id number] [facility] [place]);     Sent: 11/18/2021 11:06:58 AM CST  Subject: Ankle     Patient called and said he completed the prednisone yesterday, now today the ankle pain is back. The prednisone helped. Gave him lab results that were normal, no gout. Please call back with plan.---------------------  From: Michelle Braswell MA ([employer] Message Pool ([id number]_[facility]))   To: Topher Varma MD;     Sent: 11/18/2021 11:23:43 AM CST  Subject: FW: Ankle---------------------  From: Topher Varma MD   To: [facility] Message Pool ([id number] [facility] [place]);     Sent: 11/18/2021 1:21:36 PM CST  Subject: RE: Ankle     Advise appt for x-ray of the ankle.  I do not need to see him unless new symptoms have developed  ** Submitted: **  Order:Return to Clinic (Request)  Details:  RFV: Left ankle x-ray per [facility]         Signed by Michelle Braswell MA  11/18/2021 7:42:00 PM [facility] contacted and transferred to scheduling for x-ray only appt per [facility].

## 2022-02-16 NOTE — LETTER
(Inserted Image. Unable to display)         December 31, 2020        PRAVEEN GRACE   [address] [place], [place] [id number]        Dear PRAVEEN,    Thank you for selecting [facility] for your healthcare needs.    Our records indicate you are due for the following services:     Annual Physical     (FYI   Regarding office visits: In some instances, a video visit or telephone visit may be offered as an option.)    To schedule an appointment or if you have further questions, please contact your clinic at [phone].    Powered by [employer]    Sincerely,    Topher Varma MD

## 2022-02-16 NOTE — PROGRESS NOTES
Chief Complaint    c/o LLE injury last evening while playing softball.   c/o increased swelling/redness.  History of Present Illness       Patient is here with injury to his left leg.  He was [place] playing softball and experienced acute pain in the posterior lateral aspect of the knee.  This happened a few days ago.  He feels is getting somewhat better although his gait is affected by the pain.  There is no instability of the knee.  He denies any type of rotational twisting movement.  No bruising or warmth noted.  Review of Systems       See HPI.  All other review of systems negative.  Physical Exam   Vitals & Measurements    T: 97.7  F (Tympanic)  HR: 60 (Peripheral)  BP: 127/83  SpO2: 95%     HT: 69 in  WT: 295.5 lb  BMI: 43.63        Alert, oriented, no acute distress       Normal heart rate, nonlabored breathing       Exam of the left lower extremity reveals pain at the insertion of the biceps femoris tendon, discomfort with flexion and extension, no instability, hip exam is unremarkable.       X-ray of the knee is unremarkable.  Assessment/Plan       1. Left knee injury (S89.92XA)          Left knee injury suspect strain of the biceps femoris tendon with possible mild lateral collateral ligament strain         Advised analgesics, rest, ice.  Knee immobilizer has been dispensed which has improved his gait.  Follow-up if not improving over the next 10 days         Ordered:          XR Knee AP/Lat Left (Request), Left knee injury           Patient Information     Name:PRAVEEN GRACE      Address:      [address] [id number]     Sex:Male     YOB: 1965     [phone]     Emergency Contact:DECLINED, UNKNOWN     MRN:[id number]     FIN:[id number]     Location:[facility]     Date of Service:07/01/2021      Primary Care Physician:       NONE ,       Attending Physician:       Topher Varma MD, [phone]  Problem List/Past Medical  History    Ongoing     Asthma     Hyperlipidemia     MORBID OBESITY     Sleep apnea    Historical     No qualifying data  Procedure/Surgical History     Flexible sigmoidoscopy (05/02/2000)  Medications    ZyrTEC 10 mg oral tablet, 10 mg= 1 tab(s), Oral, daily  Allergies    No known allergies  Social History    Smoking Status     Never smoker     Alcohol      Current, Wine (5 oz), 1-2 times per month     Electronic Cigarette/Vaping      Electronic Cigarette Use: Never.     Employment/School      Employed, Work/School description: Sales.     Exercise      Exercise frequency: 1-2 times/week. Exercise type: Walking.     Home/Environment      Marital status: .     Nutrition/Health      Type of diet: Regular. Wants to lose weight: Yes.     Substance Abuse      Never     Tobacco      Never (less than 100 in lifetime)  Family History    Cancer: Father.    Chronic obstructive pulmonary disease: Mother and Father.    Tobacco user: Mother.  Immunizations          Scheduled Immunizations          Dose Date(s)          influenza virus vaccine, inactivated          10/16/2014, 10/25/2018, 11/19/2019          Other Immunizations          influenza          12/30/2017          MMR (measles/mumps/rubella)          10/19/2002          Td          03/24/2000          tetanus/diphth/pertuss (Tdap) adult/adol          10/14/2013          SARS-CoV-2 (COVID-19) Moderna-1273          04/21/2021, 05/19/2021

## 2022-02-16 NOTE — NURSING NOTE
Comprehensive Intake Entered On:  11/11/2021 12:12 PM CST    Performed On:  11/11/2021 12:07 PM CST by French ARAUJO, Michelle               Summary   Chief Complaint :   c/o left ankle pain for past couple weeks.  denies injury   Weight Measured :   296.8 lb(Converted to: 296 lb 13 oz, 134.626 kg)    Height Measured :   69 in(Converted to: 5 ft 9 in, 175.26 cm)    Body Mass Index :   43.82 kg/m2 (HI)    Body Surface Area :   2.56 m2   Systolic Blood Pressure :   129 mmHg   Diastolic Blood Pressure :   79 mmHg   Mean Arterial Pressure :   96 mmHg   Peripheral Pulse Rate :   68 bpm   BP Site :   Right arm   Pulse Site :   Radial artery   BP Method :   Electronic   HR Method :   Electronic   Temperature Tympanic :   96.9 DegF(Converted to: 36.1 DegC)  (LOW)    Languages :   English   Michelle Braswell MA - 11/11/2021 12:07 PM CST   Health Status   Allergies Verified? :   Yes   Medication History Verified? :   Yes   Medical History Verified? :   Yes   Pre-Visit Planning Status :   Completed   Tobacco Use? :   Never smoker   Michelle Braswell MA - 11/11/2021 12:07 PM CST   Consents   Consent for Immunization Exchange :   Consent Granted   Consent for Immunizations to Providers :   Consent Granted   Michelle Braswell MA - 11/11/2021 12:07 PM CST   Meds / Allergies   (As Of: 11/11/2021 12:12:48 PM CST)   Allergies (Active)   No known allergies  Estimated Onset Date:   Unspecified ; Created By:   Mally Perkins CMA; Reaction Status:   Active ; Category:   Drug ; Substance:   No known allergies ; Type:   Allergy ; Updated By:   Mally Perkins CMA; Source:   Paper Chart ; Reviewed Date:   11/1/2021 3:12 PM CDT        Medication List   (As Of: 11/11/2021 12:12:48 PM CST)   Prescription/Discharge Order    tadalafil  :   tadalafil ; Status:   Prescribed ; Ordered As Mnemonic:   tadalafil 20 mg oral tablet ; Simple Display Line:   20 mg, 1 tab(s), Oral, As Directed, 5 tab(s), 3 Refill(s) ; Ordering Provider:   Kojo LOAIZA,  Topher; Catalog Code:   tadalafil ; Order Dt/Tm:   11/1/2021 3:40:56 PM CDT            Home Meds    cetirizine  :   cetirizine ; Status:   Documented ; Ordered As Mnemonic:   ZyrTEC 10 mg oral tablet ; Simple Display Line:   10 mg, 1 tab(s), Oral, daily, 0 Refill(s) ; Catalog Code:   cetirizine ; Order Dt/Tm:   12/30/2019 3:03:18 PM CST            Social History   Social History   (As Of: 11/11/2021 12:12:48 PM CST)   Alcohol:        Current, Wine (5 oz), 1-2 times per month   (Last Updated: 12/30/2019 5:22:00 PM CST by Michelle Braswell MA)          Tobacco:        Never (less than 100 in lifetime)   (Last Updated: 7/1/2021 4:31:48 PM CDT by Michelle Braswell MA)          Electronic Cigarette/Vaping:        Electronic Cigarette Use: Never.   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   (Last Updated: 5/28/2013 11:00:14 AM CDT by Yenny Stevens)          Nutrition/Health:        Type of diet: Regular.  Wants to lose weight: Yes.   (Last Updated: 12/30/2019 5:22:53 PM CST by Michelle Braswell MA)          Exercise:        Exercise frequency: 1-2 times/week.  Exercise type: Walking.   (Last Updated: 12/30/2019 5:23:06 PM CST by Michelle Braswell MA)

## 2022-02-16 NOTE — PROGRESS NOTES
Chief Complaint    follow up right foot pain, did have some improvement after treatment. check lesion on left hand  History of Present Illness       Patient is here with continued problems regarding a corn on his lateral right foot.  I had trimmed it and treated with liquid nitrogen several months ago and he had some improvement.  He has not changed his footwear.  This can be painful enough to cause him to limp       He would also like refills of tadalafil 20 mg as needed.  He is on his half a tablet at a time.  Review of Systems       See HPI.  All other review of systems negative.  Physical Exam   Vitals & Measurements    HR: 60 (Peripheral)  BP: 134/89     HT: 69 in  WT: 295 lb  BMI: 43.56        Alert, oriented, no acute distress       Normal heart rate       Nonlabored breathing       Foot exam reveals a tender 3 mm corn under the fifth metatarsal head       Brown, flat macule on the left hand  Assessment/Plan       1. Corn of foot (L84)          This is cleansed with alcohol.  With a #15 blade and treated with liquid nitrogen         Once again advised on proper supportive footwear       2. ED (erectile dysfunction) (N52.9)          Refill tadalafil 20 mg as needed       3. SK (seborrheic keratosis) (L82.1)          Reassurance, follow-up if worsens       Orders:         tadalafil, = 1 tab(s) ( 20 mg ), Oral, As Directed, # 5 tab(s), 3 Refill(s), Type: Maintenance, Pharmacy: [id number], 1 tab(s) Oral As Directed, 69, in, 11/01/21 15:06:00 CDT, Height Measured, 295, lb, 11/01/21 15:06:00 CDT, Weight Measured, (Ordered)         tadalafil, = 1 tab(s) ( 20 mg ), Oral, As Directed, # 5 tab(s), 0 Refill(s), Type: Hard Stop, (Completed)  Patient Information     Name:PRAVEEN GRACE      Address:      [address] [id number]     Sex:Male     YOB: 1965     [phone]     Emergency Contact:DECLINED, UNKNOWN     MRN:[id number]     FIN:[id number]     Location:[facility]  [place]     Date of Service:11/01/2021      Primary Care Physician:       NONE ,       Attending Physician:       Topher Varma MD, [phone]  Problem List/Past Medical History    Ongoing     Asthma     Hyperlipidemia     MORBID OBESITY     Sleep apnea    Historical     No qualifying data  Procedure/Surgical History     Flexible sigmoidoscopy (05/02/2000)  Medications    tadalafil 20 mg oral tablet, 20 mg= 1 tab(s), Oral, As Directed, 3 refills    ZyrTEC 10 mg oral tablet, 10 mg= 1 tab(s), Oral, daily  Allergies    No known allergies  Social History    Smoking Status     Never smoker     Alcohol      Current, Wine (5 oz), 1-2 times per month     Electronic Cigarette/Vaping      Electronic Cigarette Use: Never.     Employment/School      Employed, Work/School description: Sales.     Exercise      Exercise frequency: 1-2 times/week. Exercise type: Walking.     Home/Environment      Marital status: .     Nutrition/Health      Type of diet: Regular. Wants to lose weight: Yes.     Substance Abuse      Never     Tobacco      Never (less than 100 in lifetime)  Family History    Cancer: Father.    Chronic obstructive pulmonary disease: Mother and Father.    Tobacco user: Mother.  Immunizations          Scheduled Immunizations          Dose Date(s)          influenza virus vaccine, inactivated          10/16/2014, 10/25/2018, 11/19/2019, 09/15/2020          Other Immunizations          influenza          12/30/2017          MMR (measles/mumps/rubella)          10/19/2002          Td          03/24/2000          tetanus/diphth/pertuss (Tdap) adult/adol          10/14/2013          SARS-CoV-2 (COVID-19) Moderna-1273          04/21/2021, 05/19/2021

## 2022-06-08 RX ORDER — TADALAFIL 20 MG/1
20 TABLET ORAL PRN
COMMUNITY
Start: 2021-11-01 | End: 2022-06-09

## 2022-06-09 ENCOUNTER — OFFICE VISIT (OUTPATIENT)
Dept: FAMILY MEDICINE | Facility: CLINIC | Age: 57
End: 2022-06-09
Payer: COMMERCIAL

## 2022-06-09 VITALS
BODY MASS INDEX: 43.55 KG/M2 | DIASTOLIC BLOOD PRESSURE: 80 MMHG | WEIGHT: 294 LBS | SYSTOLIC BLOOD PRESSURE: 118 MMHG | HEIGHT: 69 IN | TEMPERATURE: 97.6 F | HEART RATE: 68 BPM

## 2022-06-09 DIAGNOSIS — N52.9 ERECTILE DYSFUNCTION, UNSPECIFIED ERECTILE DYSFUNCTION TYPE: ICD-10-CM

## 2022-06-09 DIAGNOSIS — Z00.00 ENCOUNTER FOR WELLNESS EXAMINATION: Primary | ICD-10-CM

## 2022-06-09 DIAGNOSIS — Z13.1 SCREENING FOR DIABETES MELLITUS: ICD-10-CM

## 2022-06-09 DIAGNOSIS — E66.01 MORBID OBESITY (H): ICD-10-CM

## 2022-06-09 DIAGNOSIS — Z13.6 SCREENING FOR CARDIOVASCULAR CONDITION: ICD-10-CM

## 2022-06-09 DIAGNOSIS — Z12.5 SCREENING FOR PROSTATE CANCER: ICD-10-CM

## 2022-06-09 DIAGNOSIS — M25.579 PAIN IN JOINT, ANKLE AND FOOT, UNSPECIFIED LATERALITY: ICD-10-CM

## 2022-06-09 PROBLEM — E78.5 HYPERLIPIDEMIA: Status: ACTIVE | Noted: 2022-06-09

## 2022-06-09 LAB
CHOLEST SERPL-MCNC: 199 MG/DL
FASTING STATUS PATIENT QL REPORTED: YES
GLUCOSE BLD-MCNC: 94 MG/DL (ref 60–99)
HDLC SERPL-MCNC: 53 MG/DL
LDLC SERPL CALC-MCNC: 122 MG/DL
NONHDLC SERPL-MCNC: 146 MG/DL
PSA SERPL-MCNC: 1.03 UG/L (ref 0–4)
TRIGL SERPL-MCNC: 118 MG/DL

## 2022-06-09 PROCEDURE — 36415 COLL VENOUS BLD VENIPUNCTURE: CPT | Performed by: FAMILY MEDICINE

## 2022-06-09 PROCEDURE — 80061 LIPID PANEL: CPT | Performed by: FAMILY MEDICINE

## 2022-06-09 PROCEDURE — 84403 ASSAY OF TOTAL TESTOSTERONE: CPT | Performed by: FAMILY MEDICINE

## 2022-06-09 PROCEDURE — 99213 OFFICE O/P EST LOW 20 MIN: CPT | Mod: 25 | Performed by: FAMILY MEDICINE

## 2022-06-09 PROCEDURE — 82947 ASSAY GLUCOSE BLOOD QUANT: CPT | Mod: QW | Performed by: FAMILY MEDICINE

## 2022-06-09 PROCEDURE — G0103 PSA SCREENING: HCPCS | Performed by: FAMILY MEDICINE

## 2022-06-09 PROCEDURE — 99396 PREV VISIT EST AGE 40-64: CPT | Performed by: FAMILY MEDICINE

## 2022-06-09 ASSESSMENT — ENCOUNTER SYMPTOMS
FEVER: 0
MYALGIAS: 0
PARESTHESIAS: 0
JOINT SWELLING: 1
PALPITATIONS: 0
EYE PAIN: 0
COUGH: 0
SORE THROAT: 0
SHORTNESS OF BREATH: 0
FREQUENCY: 0
NERVOUS/ANXIOUS: 0
HEMATURIA: 0
DYSURIA: 0
HEADACHES: 0
HEMATOCHEZIA: 0
HEARTBURN: 0
CHILLS: 0
NAUSEA: 0
CONSTIPATION: 0
ABDOMINAL PAIN: 0
ARTHRALGIAS: 1
DIZZINESS: 0
WEAKNESS: 0
DIARRHEA: 0

## 2022-06-09 NOTE — LETTER
June 9, 2022      Bharat Moss   [address] [place] [phone]        To Whom It May Concern:      Bharat Moss  was seen on 6/9/22.          Sincerely,        Topher Varma MD

## 2022-06-09 NOTE — LETTER
June 13, 2022      Bharat Moss   [address] [place] [phone]        Dear ,    We are writing to inform you of your test results.    Your test results fall within the expected range(s) or remain unchanged from previous results.  Please continue with current treatment plan.    Your testosterone level was within the normal range.  Cholesterol numbers look good.    Resulted Orders   Lipid panel reflex to direct LDL Fasting   Result Value Ref Range    Cholesterol 199 <200 mg/dL    Triglycerides 118 <150 mg/dL    Direct Measure HDL 53 >=40 mg/dL    LDL Cholesterol Calculated 122 (H) <=100 mg/dL    Non HDL Cholesterol 146 (H) <130 mg/dL    Patient Fasting > 8hrs? Yes     Narrative    Cholesterol  Desirable:  <200 mg/dL    Triglycerides  Normal:  Less than 150 mg/dL  Borderline High:  150-199 mg/dL  High:  200-499 mg/dL  Very High:  Greater than or equal to 500 mg/dL    Direct Measure HDL  Female:  Greater than or equal to 50 mg/dL   Male:  Greater than or equal to 40 mg/dL    LDL Cholesterol  Desirable:  <100mg/dL  Above Desirable:  100-129 mg/dL   Borderline High:  130-159 mg/dL   High:  160-189 mg/dL   Very High:  >= 190 mg/dL    Non HDL Cholesterol  Desirable:  130 mg/dL  Above Desirable:  130-159 mg/dL  Borderline High:  160-189 mg/dL  High:  190-219 mg/dL  Very High:  Greater than or equal to 220 mg/dL   Glucose, whole blood   Result Value Ref Range    Glucose Whole Blood 94 60 - 99 mg/dL   PROSTATE SPEC ANTIGEN SCREEN   Result Value Ref Range    Prostate Specific Antigen Screen 1.03 0.00 - 4.00 ug/L    Narrative    Assay Method:  Chemiluminescence using Siemens   Vista analyzer.   Testosterone total   Result Value Ref Range    Testosterone Total 405 240 - 950 ng/dL       If you have any questions or concerns, please call the clinic at the number listed above.       Sincerely,      Topher Varma MD

## 2022-06-09 NOTE — PROGRESS NOTES
SUBJECTIVE:   CC: Bharat Moss is an 56 year old male who presents for preventative health visit.            Healthy Habits:     Getting at least 3 servings of Calcium per day:  NO    Bi-annual eye exam:  NO    Dental care twice a year:  Yes    Sleep apnea or symptoms of sleep apnea:  Daytime drowsiness, Excessive snoring and Sleep apnea    Diet:  Regular (no restrictions)    Frequency of exercise:  None    Taking medications regularly:  Yes    Medication side effects:  None    PHQ-2 Total Score: 0    Additional concerns today:  Yes    Discuss alternative for Cialis - feels it does not work for him.         Today's PHQ-2 Score:   PHQ-2 ( 1999 Pfizer) 6/9/2022   Q1: Little interest or pleasure in doing things 0   Q2: Feeling down, depressed or hopeless 0   PHQ-2 Score 0   Q1: Little interest or pleasure in doing things Not at all   Q2: Feeling down, depressed or hopeless Not at all   PHQ-2 Score 0       Abuse: Current or Past(Physical, Sexual or Emotional)- No  Do you feel safe in your environment? Yes    Have you ever done Advance Care Planning? (For example, a Health Directive, POLST, or a discussion with a medical provider or your loved ones about your wishes): No, advance care planning information given to patient to review.  Patient plans to discuss their wishes with loved ones or provider.      Social History     Tobacco Use     Smoking status: Never Smoker     Smokeless tobacco: Never Used   Substance Use Topics     Alcohol use: Not Currently     If you drink alcohol do you typically have >3 drinks per day or >7 drinks per week? No    Alcohol Use 6/9/2022   Prescreen: >3 drinks/day or >7 drinks/week? No       Last PSA: No results found for: PSA    Reviewed orders with patient. Reviewed health maintenance and updated orders accordingly - Yes  Lab work is in process    Reviewed and updated as needed this visit by clinical staff   Tobacco  Allergies  Meds                Reviewed and updated as needed this  "visit by Provider                   No past medical history on file.   Past Surgical History:   Procedure Laterality Date     SIGMOIDOSCOPY FLEXIBLE  05/02/2000       Review of Systems   Constitutional: Negative for chills and fever.   HENT: Negative for congestion, ear pain, hearing loss and sore throat.    Eyes: Negative for pain and visual disturbance.   Respiratory: Negative for cough and shortness of breath.    Cardiovascular: Negative for chest pain, palpitations and peripheral edema.   Gastrointestinal: Negative for abdominal pain, constipation, diarrhea, heartburn, hematochezia and nausea.   Genitourinary: Positive for impotence. Negative for dysuria, frequency, genital sores, hematuria and urgency.   Musculoskeletal: Positive for arthralgias and joint swelling. Negative for myalgias.   Skin: Negative for rash.   Neurological: Negative for dizziness, weakness, headaches and paresthesias.   Psychiatric/Behavioral: Negative for mood changes. The patient is not nervous/anxious.          OBJECTIVE:   /80 (BP Location: Right arm, Patient Position: Sitting, Cuff Size: Adult Large)   Pulse 68   Temp 97.6  F (36.4  C) (Tympanic)   Ht 1.753 m (5' 9\")   Wt 133.4 kg (294 lb)   BMI 43.42 kg/m      Physical Exam  GENERAL: healthy, alert and no distress  EYES: Eyes grossly normal to inspection, PERRL and conjunctivae and sclerae normal  HENT: ear canals and TM's normal, nose and mouth without ulcers or lesions  NECK: no adenopathy, no asymmetry, masses, or scars and thyroid normal to palpation  RESP: lungs clear to auscultation - no rales, rhonchi or wheezes  CV: regular rate and rhythm, normal S1 S2, no S3 or S4, no murmur, click or rub, no peripheral edema and peripheral pulses strong  ABDOMEN: soft, nontender, no hepatosplenomegaly, no masses and bowel sounds normal  MS: no gross musculoskeletal defects noted, no edema  SKIN: no suspicious lesions or rashes  NEURO: Normal strength and tone, mentation intact " "and speech normal  PSYCH: mentation appears normal, affect normal/bright        ASSESSMENT/PLAN:   (Z00.00) Encounter for wellness examination  (primary encounter diagnosis)  Comment: progressing as expected  Plan: discussed weight loss, heart healthy diet, colon cancer screening (due in 2 years), appropriate activity    (E66.01) Morbid obesity (H)  Plan: discussed weight loss, heart healthy diet    (M25.579) Pain in joint, ankle and foot, unspecified lateralityquality of life  Comment: unchanged, affecting   Plan: Orthopedic  Referral        Referral to podiatry    (N52.9) Erectile dysfunction, unspecified erectile dysfunction type  Comment: unchanged, no significantly improved with tadalafil  Plan: Testosterone total        Consider sildenafil    (Z13.6) Screening for cardiovascular condition2  Plan: Lipid panel reflex to direct LDL Fasting            (Z13.1) Screening for diabetes mellitus  Plan: Glucose, whole blood            (Z12.5) Screening for prostate cancer  Plan: PROSTATE SPEC ANTIGEN SCREEN            COUNSELING:   Reviewed preventive health counseling, as reflected in patient instructions       Regular exercise       Healthy diet/nutrition       Vision screening       Hearing screening       Colorectal cancer screening       Prostate cancer screening    Estimated body mass index is 43.42 kg/m  as calculated from the following:    Height as of this encounter: 1.753 m (5' 9\").    Weight as of this encounter: 133.4 kg (294 lb).     Weight management plan: Discussed healthy diet and exercise guidelines    He reports that he has never smoked. He has never used smokeless tobacco.      Counseling Resources:  ATP IV Guidelines  Pooled Cohorts Equation Calculator  FRAX Risk Assessment  ICSI Preventive Guidelines  Dietary Guidelines for Americans, 2010  USDA's MyPlate  ASA Prophylaxis  Lung CA Screening    Topher Varma MD  [facility]  "

## 2022-06-09 NOTE — LETTER
Humaira 10, 2022      Bharat Moss   [address] [place] [phone]        Dear ,    We are writing to inform you of your test results.    Your test results fall within the expected range(s) or remain unchanged from previous results.  Please continue with current treatment plan.    Resulted Orders   Lipid panel reflex to direct LDL Fasting   Result Value Ref Range    Cholesterol 199 <200 mg/dL    Triglycerides 118 <150 mg/dL    Direct Measure HDL 53 >=40 mg/dL    LDL Cholesterol Calculated 122 (H) <=100 mg/dL    Non HDL Cholesterol 146 (H) <130 mg/dL    Patient Fasting > 8hrs? Yes     Narrative    Cholesterol  Desirable:  <200 mg/dL    Triglycerides  Normal:  Less than 150 mg/dL  Borderline High:  150-199 mg/dL  High:  200-499 mg/dL  Very High:  Greater than or equal to 500 mg/dL    Direct Measure HDL  Female:  Greater than or equal to 50 mg/dL   Male:  Greater than or equal to 40 mg/dL    LDL Cholesterol  Desirable:  <100mg/dL  Above Desirable:  100-129 mg/dL   Borderline High:  130-159 mg/dL   High:  160-189 mg/dL   Very High:  >= 190 mg/dL    Non HDL Cholesterol  Desirable:  130 mg/dL  Above Desirable:  130-159 mg/dL  Borderline High:  160-189 mg/dL  High:  190-219 mg/dL  Very High:  Greater than or equal to 220 mg/dL   Glucose, whole blood   Result Value Ref Range    Glucose Whole Blood 94 60 - 99 mg/dL   PROSTATE SPEC ANTIGEN SCREEN   Result Value Ref Range    Prostate Specific Antigen Screen 1.03 0.00 - 4.00 ug/L    Narrative    Assay Method:  Chemiluminescence using Siemens   Vista analyzer.       If you have any questions or concerns, please call the clinic at the number listed above.       Sincerely,      Topher Varma MD

## 2022-06-13 LAB — TESTOST SERPL-MCNC: 405 NG/DL (ref 240–950)

## 2022-06-22 ENCOUNTER — TRANSFERRED RECORDS (OUTPATIENT)
Dept: HEALTH INFORMATION MANAGEMENT | Facility: CLINIC | Age: 57
End: 2022-06-22

## 2022-07-25 ENCOUNTER — TELEPHONE (OUTPATIENT)
Dept: FAMILY MEDICINE | Facility: CLINIC | Age: 57
End: 2022-07-25

## 2022-07-25 DIAGNOSIS — N52.9 ERECTILE DYSFUNCTION, UNSPECIFIED ERECTILE DYSFUNCTION TYPE: Primary | ICD-10-CM

## 2022-07-25 NOTE — TELEPHONE ENCOUNTER
Reason for Call:  Request for results comments:    Name of test or procedure:     ALL LABS FROM 6/09/2022 do not have aany provider comments or notes     Date of test of procedure: 6/09/2022    Location of the test or procedure: [facility]    OK to leave the result message on voice mail or with a family member? YES    Phone number Patient can be reached at:  Home number on file [phone] (home)    Additional comments: Patient was seen on 6/09/2022 just received letter in mail on 7/25/2022 to results of labs test. No provider note or comments in chart or direction as to what patient is to do next.    ALSO r/t this patient is upset that his ED is not being managed. Current medication is NOT working and would like to have provider direction.    Preferred Pharmacy: [facility]     Call taken on 7/25/2022 at 4:44 PM by Chantelle Pritchett, RN                                Patient received results of labs in mail nothing in chart or comments on labs results in chart.    Labs done at [facility].     Erectile Disfunction needs better medication.     Ok to leave detailed message     [facility]

## 2022-07-26 NOTE — TELEPHONE ENCOUNTER
6/9/22 OV wellness exam    1: lab result note states 'results fall within expected range. Continue current treatment plan. Per message, pt wondering what to do next.     2: Please advise on ED concern. Per note:

## 2022-08-02 RX ORDER — SILDENAFIL 50 MG/1
50 TABLET, FILM COATED ORAL DAILY PRN
Qty: 15 TABLET | Refills: 1 | Status: SHIPPED | OUTPATIENT
Start: 2022-08-02 | End: 2022-10-04

## 2022-08-02 NOTE — TELEPHONE ENCOUNTER
Pt informed of new rx sent to [facility]--[facility].  He is wondering about his testosterone.  Did inform him that level was at 405 and range is from 240-950.  He is wondering if there is a supplement that he could take.  Please advise.

## 2022-08-04 NOTE — TELEPHONE ENCOUNTER
Pt informed not to take testosterone supplements per [facility]TG and to try the sildenafil.  If not having improvements, pt will call back to discuss alternatives.

## 2022-09-21 ENCOUNTER — TRANSFERRED RECORDS (OUTPATIENT)
Dept: HEALTH INFORMATION MANAGEMENT | Facility: CLINIC | Age: 57
End: 2022-09-21

## 2022-10-03 ENCOUNTER — NURSE TRIAGE (OUTPATIENT)
Dept: NURSING | Facility: CLINIC | Age: 57
End: 2022-10-03

## 2022-10-03 NOTE — TELEPHONE ENCOUNTER
Nurse Triage SBAR    Is this a 2nd Level Triage? NO    Situation:   Spoke with 56 yr old Bharat who c/o:    Sent to see a specialist at [facility] for foot pain on 9/21/22.  Pt reports his feet hurt at the end of the work day causing some difficulty walking.  Symptoms began about 9 months ago.     Orthopedics evaluation indicated he may be retaining water and may need a water pill.    Reports mild swelling in both ankles at the sock line.    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  By the end of the shift more foot discomfort.  Has tried inserts for the feet.  May need custom made inserts per orthopedics.    Assessment:   Evaluation for possible edema.     Protocol Recommended Disposition:   See in office within 3 days.    Recommendation:   Advised OV within 3 days.  Will consult with PCP per patient request for ability to work into office schedule or other instruction.  Please advise.  Advised pt to elevate feet when resting or lie down once or twice a day for 20 minutes.  Advised to call back if symptoms are worsening.    Routed to provider    Does the patient meet one of the following criteria for ADS visit consideration? 16+ years old, with an [facility] PCP     TIP  Providers, please consider if this condition is appropriate for management at one of our Acute and Diagnostic Services sites.     If patient is a good candidate, please use dotphrase <dot>triageresponse and select Refer to ADS to document.    Amber Perkins RN  [facility]    Reason for Disposition    Swelling of both ankles (i.e., pedal edema)    MILD swelling of both ankles (i.e., pedal edema) AND new-onset or worsening    Additional Information    Negative: Chest pain    Negative: Followed an ankle injury    Negative: Followed a bee sting and has localized swelling (e.g., small area of puffy or swollen skin)    Negative: Followed an insect bite and has localized swelling (e.g., small area of puffy or swollen  skin)    Negative: Ankle pain is main symptom    Negative: Sounds like a life-threatening emergency to the triager    Negative: Chest pain    Negative: Small area of swelling and followed an insect bite to the area    Negative: Followed a knee injury    Negative: Ankle or foot injury    Negative: Pregnant with leg swelling or edema    Negative: Difficulty breathing at rest    Negative: Entire foot is cool or blue in comparison to other side    Negative: SEVERE swelling (e.g., swelling extends above knee, entire leg is swollen, weeping fluid)    Negative: Thigh or calf pain and only 1 side and present > 1 hour    Negative: Thigh, calf, or ankle swelling in only one leg    Negative: Thigh, calf, or ankle swelling in both legs, but one side is definitely more swollen (Exception: longstanding difference between legs)    Negative: Cast on leg or ankle and has increasing pain    Negative: Can't walk or can barely stand (new-onset)    Negative: Fever and red area (or area very tender to touch)    Negative: Patient sounds very sick or weak to the triager    Negative: Swelling of face, arm or hands  (Exception: Slight puffiness of fingers during hot weather.)    Negative: Pregnant 20 or more weeks and sudden weight gain (i.e., > 2 lbs, 1 kg in one week)    Negative: MODERATE swelling of both ankles (e.g., swelling extends up to the knees) AND new-onset or worsening    Negative: Difficulty breathing with exertion AND worsening or new-onset    Negative: Looks like a boil, infected sore, deep ulcer, or other infected rash (spreading redness, pus)    Negative: Patient wants to be seen    Protocols used: ANKLE SWELLING-A-OH, LEG SWELLING AND EDEMA-A-OH

## 2022-10-04 ENCOUNTER — OFFICE VISIT (OUTPATIENT)
Dept: FAMILY MEDICINE | Facility: CLINIC | Age: 57
End: 2022-10-04
Payer: COMMERCIAL

## 2022-10-04 VITALS
WEIGHT: 306.6 LBS | DIASTOLIC BLOOD PRESSURE: 86 MMHG | SYSTOLIC BLOOD PRESSURE: 128 MMHG | BODY MASS INDEX: 45.41 KG/M2 | HEART RATE: 71 BPM | HEIGHT: 69 IN | TEMPERATURE: 97.1 F

## 2022-10-04 DIAGNOSIS — R60.0 BILATERAL LEG EDEMA: Primary | ICD-10-CM

## 2022-10-04 DIAGNOSIS — N52.9 ERECTILE DYSFUNCTION, UNSPECIFIED ERECTILE DYSFUNCTION TYPE: ICD-10-CM

## 2022-10-04 PROCEDURE — 99213 OFFICE O/P EST LOW 20 MIN: CPT | Performed by: FAMILY MEDICINE

## 2022-10-04 RX ORDER — SILDENAFIL 100 MG/1
100 TABLET, FILM COATED ORAL DAILY PRN
Qty: 15 TABLET | Refills: 3 | Status: SHIPPED | OUTPATIENT
Start: 2022-10-04 | End: 2023-07-27

## 2022-10-04 RX ORDER — HYDROCHLOROTHIAZIDE 12.5 MG/1
12.5 TABLET ORAL DAILY
Qty: 30 TABLET | Refills: 3 | Status: SHIPPED | OUTPATIENT
Start: 2022-10-04 | End: 2023-01-30

## 2022-10-04 ASSESSMENT — ASTHMA QUESTIONNAIRES
ACT_TOTALSCORE: 25
QUESTION_2 LAST FOUR WEEKS HOW OFTEN HAVE YOU HAD SHORTNESS OF BREATH: NOT AT ALL
QUESTION_4 LAST FOUR WEEKS HOW OFTEN HAVE YOU USED YOUR RESCUE INHALER OR NEBULIZER MEDICATION (SUCH AS ALBUTEROL): NOT AT ALL
ACT_TOTALSCORE: 25
QUESTION_5 LAST FOUR WEEKS HOW WOULD YOU RATE YOUR ASTHMA CONTROL: COMPLETELY CONTROLLED
QUESTION_3 LAST FOUR WEEKS HOW OFTEN DID YOUR ASTHMA SYMPTOMS (WHEEZING, COUGHING, SHORTNESS OF BREATH, CHEST TIGHTNESS OR PAIN) WAKE YOU UP AT NIGHT OR EARLIER THAN USUAL IN THE MORNING: NOT AT ALL
QUESTION_1 LAST FOUR WEEKS HOW MUCH OF THE TIME DID YOUR ASTHMA KEEP YOU FROM GETTING AS MUCH DONE AT WORK, SCHOOL OR AT HOME: NONE OF THE TIME

## 2022-10-04 ASSESSMENT — LIFESTYLE VARIABLES
SKIP TO QUESTIONS 9-10: 1
AUDIT-C TOTAL SCORE: 2
HOW OFTEN DO YOU HAVE A DRINK CONTAINING ALCOHOL: 2-4 TIMES A MONTH
HOW MANY STANDARD DRINKS CONTAINING ALCOHOL DO YOU HAVE ON A TYPICAL DAY: 1 OR 2
HOW OFTEN DO YOU HAVE SIX OR MORE DRINKS ON ONE OCCASION: NEVER

## 2022-10-04 NOTE — PROGRESS NOTES
Assessment & Plan     Bilateral leg edema  Trial of low-dose hydrochlorothiazide, compression stockings  Check BMP in about a month and follow-up if symptoms or not improving  - hydrochlorothiazide (HYDRODIURIL) 12.5 MG tablet; Take 1 tablet (12.5 mg) by mouth daily  - Basic metabolic panel  (Ca, Cl, CO2, Creat, Gluc, K, Na, BUN); Future    Erectile dysfunction, unspecified erectile dysfunction type  Increase sildenafil to 100 mg as needed  - sildenafil (VIAGRA) 100 MG tablet; Take 1 tablet (100 mg) by mouth daily as needed (sexual activity)                 No follow-ups on file.    Topher Varma MD  [facility] [place]    Jose G Nicholson is a 56 year old accompanied by his self, presenting for the following health issues:  Musculoskeletal Problem (C/o increased swelling to lower legs--ongoing issue)      History of Present Illness       Reason for visit:  Hurts to walk    He eats 0-1 servings of fruits and vegetables daily.He consumes 1 sweetened beverage(s) daily.He exercises with enough effort to increase his heart rate 9 or less minutes per day.  He exercises with enough effort to increase his heart rate 3 or less days per week.   He is taking medications regularly.       Concern - swelling to lower legs  Onset: 9 months  Description: ongoing swelling to legs  Intensity: moderate  Progression of Symptoms:  same and constant  Accompanying Signs & Symptoms: painful to walk  Previous history of similar problem: no  Precipitating factors:        Worsened by: walking, being on feet  Alleviating factors:        Improved by: rest  Therapies tried and outcome: was seen by foot specialist at [facility]    Patient continues to have discomfort in the lower legs.  Swelling has been a persistent problem.  Ankles and feet are feeling a bit better with recent orthopedic interventions.  He reports current dose of sildenafil was not adequate.    Review of Systems   Constitutional, HEENT,  "cardiovascular, pulmonary, gi and gu systems are negative, except as otherwise noted.      Objective    /86 (BP Location: Right arm, Patient Position: Sitting, Cuff Size: Adult Large)   Pulse 71   Temp 97.1  F (36.2  C) (Tympanic)   Ht 1.753 m (5' 9\")   Wt 139.1 kg (306 lb 9.6 oz)   BMI 45.28 kg/m    Body mass index is 45.28 kg/m .  Physical Exam   Alert, oriented, no acute distress  Normal heart rate  Nonlabored breathing  Exam of lower extremities reveals 1-2+ edema, no skin breakdown                    "

## 2023-01-05 ENCOUNTER — TELEPHONE (OUTPATIENT)
Dept: FAMILY MEDICINE | Facility: CLINIC | Age: 58
End: 2023-01-05

## 2023-01-05 NOTE — TELEPHONE ENCOUNTER
Patient is currently taking sildenafil 100 mg, but patient states this medication is not lasting very long.  He is asking if there are any other alternative medications that work longer (an hour).    Patient requests a return call to discuss.

## 2023-01-29 DIAGNOSIS — R60.0 BILATERAL LEG EDEMA: ICD-10-CM

## 2023-01-30 RX ORDER — HYDROCHLOROTHIAZIDE 12.5 MG/1
TABLET ORAL
Qty: 30 TABLET | Refills: 3 | Status: SHIPPED | OUTPATIENT
Start: 2023-01-30 | End: 2023-08-16

## 2023-01-30 NOTE — TELEPHONE ENCOUNTER
"      Last office visit: 10/4/2022     Future Appointments 1/30/2023 - 7/29/2023    None          Requested Prescriptions   Pending Prescriptions Disp Refills     hydrochlorothiazide (HYDRODIURIL) 12.5 MG tablet [Pharmacy Med Name: HYDROCHLOROTHIAZIDE 12.5MG TABLETS] 30 tablet 3     Sig: TAKE 1 TABLET(12.5 MG) BY MOUTH DAILY       Diuretics (Including Combos) Protocol Failed - 1/29/2023  3:45 AM        Failed - Normal serum creatinine on file in past 12 months     Recent Labs   Lab Test 09/17/18  1006   CR 0.95              Failed - Normal serum potassium on file in past 12 months     No lab results found.                 Failed - Normal serum sodium on file in past 12 months     No lab results found.           Passed - Blood pressure under 140/90 in past 12 months     BP Readings from Last 3 Encounters:   10/04/22 128/86   06/09/22 118/80   11/11/21 129/79                 Passed - Recent (12 mo) or future (30 days) visit within the authorizing provider's specialty     Patient has had an office visit with the authorizing provider or a provider within the authorizing providers department within the previous 12 mos or has a future within next 30 days. See \"Patient Info\" tab in inbasket, or \"Choose Columns\" in Meds & Orders section of the refill encounter.              Passed - Medication is active on med list        Passed - Patient is age 18 or older                   "

## 2023-04-19 ENCOUNTER — OFFICE VISIT (OUTPATIENT)
Dept: FAMILY MEDICINE | Facility: CLINIC | Age: 58
End: 2023-04-19
Payer: COMMERCIAL

## 2023-04-19 VITALS
OXYGEN SATURATION: 99 % | SYSTOLIC BLOOD PRESSURE: 135 MMHG | BODY MASS INDEX: 41.65 KG/M2 | WEIGHT: 281.2 LBS | DIASTOLIC BLOOD PRESSURE: 90 MMHG | RESPIRATION RATE: 16 BRPM | HEIGHT: 69 IN | HEART RATE: 61 BPM | TEMPERATURE: 96.5 F

## 2023-04-19 DIAGNOSIS — M79.662 PAIN OF LEFT LOWER LEG: Primary | ICD-10-CM

## 2023-04-19 LAB
CK SERPL-CCNC: 180 U/L (ref 39–308)
D DIMER PPP FEU-MCNC: 0.34 UG/ML FEU (ref 0–0.5)
ERYTHROCYTE [SEDIMENTATION RATE] IN BLOOD BY WESTERGREN METHOD: 7 MM/HR (ref 0–20)

## 2023-04-19 PROCEDURE — 36415 COLL VENOUS BLD VENIPUNCTURE: CPT | Performed by: FAMILY MEDICINE

## 2023-04-19 PROCEDURE — 85379 FIBRIN DEGRADATION QUANT: CPT | Performed by: FAMILY MEDICINE

## 2023-04-19 PROCEDURE — 82550 ASSAY OF CK (CPK): CPT | Performed by: FAMILY MEDICINE

## 2023-04-19 PROCEDURE — 85652 RBC SED RATE AUTOMATED: CPT | Performed by: FAMILY MEDICINE

## 2023-04-19 PROCEDURE — 99213 OFFICE O/P EST LOW 20 MIN: CPT | Performed by: FAMILY MEDICINE

## 2023-04-19 ASSESSMENT — PATIENT HEALTH QUESTIONNAIRE - PHQ9
SUM OF ALL RESPONSES TO PHQ QUESTIONS 1-9: 5
10. IF YOU CHECKED OFF ANY PROBLEMS, HOW DIFFICULT HAVE THESE PROBLEMS MADE IT FOR YOU TO DO YOUR WORK, TAKE CARE OF THINGS AT HOME, OR GET ALONG WITH OTHER PEOPLE: SOMEWHAT DIFFICULT
SUM OF ALL RESPONSES TO PHQ QUESTIONS 1-9: 5

## 2023-04-19 ASSESSMENT — ASTHMA QUESTIONNAIRES
QUESTION_1 LAST FOUR WEEKS HOW MUCH OF THE TIME DID YOUR ASTHMA KEEP YOU FROM GETTING AS MUCH DONE AT WORK, SCHOOL OR AT HOME: NONE OF THE TIME
ACT_TOTALSCORE: 25
QUESTION_5 LAST FOUR WEEKS HOW WOULD YOU RATE YOUR ASTHMA CONTROL: COMPLETELY CONTROLLED
QUESTION_3 LAST FOUR WEEKS HOW OFTEN DID YOUR ASTHMA SYMPTOMS (WHEEZING, COUGHING, SHORTNESS OF BREATH, CHEST TIGHTNESS OR PAIN) WAKE YOU UP AT NIGHT OR EARLIER THAN USUAL IN THE MORNING: NOT AT ALL
QUESTION_4 LAST FOUR WEEKS HOW OFTEN HAVE YOU USED YOUR RESCUE INHALER OR NEBULIZER MEDICATION (SUCH AS ALBUTEROL): NOT AT ALL
QUESTION_2 LAST FOUR WEEKS HOW OFTEN HAVE YOU HAD SHORTNESS OF BREATH: NOT AT ALL
ACT_TOTALSCORE: 25

## 2023-04-19 NOTE — PROGRESS NOTES
"  Assessment & Plan     Pain of left lower leg  Patient has new onset pain in the left leg.  It is most prominent when he is using the leg to stand up or bear weight upon standing.  Differential includes muscle strain, knee pathology, thrombophlebitis.  Evaluation as below and follow-up pending results  - Erythrocyte sedimentation rate auto  - D dimer, quantitative  - CK total             BMI:   Estimated body mass index is 41.53 kg/m  as calculated from the following:    Height as of this encounter: 1.753 m (5' 9\").    Weight as of this encounter: 127.6 kg (281 lb 3.2 oz).           Topher Varma MD  [facility]    Jose G Nicholson is a 57 year old, presenting for the following health issues:  Musculoskeletal Problem (Left leg pain, weakness x10 days ago.  No recent injury to leg, hurts when getting out of chair.  Did have previous injury years ago)        4/19/2023     2:35 PM   Additional Questions   Roomed by Michelle DUNCAN CMA   Accompanied by JOSE         4/19/2023     2:35 PM   Patient Reported Additional Medications   Patient reports taking the following new medications None     Musculoskeletal Problem    History of Present Illness       Reason for visit:  Left leg hurts little strength to stand up from a chair  Symptom onset:  1-2 weeks ago    He eats 0-1 servings of fruits and vegetables daily.He consumes 2 sweetened beverage(s) daily.He exercises with enough effort to increase his heart rate 9 or less minutes per day.  He exercises with enough effort to increase his heart rate 3 or less days per week.   He is taking medications regularly.    Today's PHQ-9         PHQ-9 Total Score: 5    PHQ-9 Q9 Thoughts of better off dead/self-harm past 2 weeks :   Not at all    How difficult have these problems made it for you to do your work, take care of things at home, or get along with other people: Somewhat difficult  Denies swelling.    Patient reports 2-week history of left posterior, " "proximal leg pain which is most prominent when trying to stand up from sitting position.  He reports difficulty getting up from a chair or getting off the toilet.  He has not had this problem in the past.  He denies any recent injury.        Review of Systems   Constitutional, HEENT, cardiovascular, pulmonary, gi and gu systems are negative, except as otherwise noted.      Objective    BP (!) 135/90 (BP Location: Right arm, Patient Position: Sitting, Cuff Size: Adult Large)   Pulse 61   Temp (!) 96.5  F (35.8  C) (Tympanic)   Resp 16   Ht 1.753 m (5' 9\")   Wt 127.6 kg (281 lb 3.2 oz)   SpO2 99%   BMI 41.53 kg/m    Body mass index is 41.53 kg/m .  Physical Exam   Alert, oriented, no acute distress  Neck supple  Lungs clear  Heart has a regular rate and rhythm  Exam of the hips reveals normal range of motion, knees have normal range of motion, no tenderness  Normal strength, normal sensation, normal deep tendon reflexes bilaterally.  Slight tenderness of the proximal posterior calf                      "

## 2023-04-20 ENCOUNTER — TELEPHONE (OUTPATIENT)
Dept: FAMILY MEDICINE | Facility: CLINIC | Age: 58
End: 2023-04-20
Payer: COMMERCIAL

## 2023-04-20 NOTE — TELEPHONE ENCOUNTER
----- Message from Topher Varma MD sent at 4/20/2023 12:45 PM CDT -----  Team - please call patient with results.  His labs from yesterday were all normal.  I advise daily stretching of the calves and hamstring muscles.  Follow-up if symptoms or not improving over the next 10 to 14 days.

## 2023-04-20 NOTE — TELEPHONE ENCOUNTER
LM for pt re: normal labs and to do leg stretches per Dr. Varma.  Follow up in 10-14 days if sx persist.

## 2023-07-27 ENCOUNTER — OFFICE VISIT (OUTPATIENT)
Dept: FAMILY MEDICINE | Facility: CLINIC | Age: 58
End: 2023-07-27
Payer: COMMERCIAL

## 2023-07-27 VITALS
RESPIRATION RATE: 20 BRPM | DIASTOLIC BLOOD PRESSURE: 93 MMHG | SYSTOLIC BLOOD PRESSURE: 144 MMHG | WEIGHT: 286.4 LBS | BODY MASS INDEX: 42.42 KG/M2 | HEIGHT: 69 IN | OXYGEN SATURATION: 97 % | TEMPERATURE: 97.4 F | HEART RATE: 75 BPM

## 2023-07-27 DIAGNOSIS — R60.0 BILATERAL LEG EDEMA: ICD-10-CM

## 2023-07-27 DIAGNOSIS — N52.9 ERECTILE DYSFUNCTION, UNSPECIFIED ERECTILE DYSFUNCTION TYPE: ICD-10-CM

## 2023-07-27 DIAGNOSIS — E78.2 MIXED HYPERLIPIDEMIA: Primary | ICD-10-CM

## 2023-07-27 DIAGNOSIS — M79.10 MUSCLE PAIN: ICD-10-CM

## 2023-07-27 LAB — ERYTHROCYTE [SEDIMENTATION RATE] IN BLOOD BY WESTERGREN METHOD: 6 MM/HR (ref 0–20)

## 2023-07-27 PROCEDURE — 36415 COLL VENOUS BLD VENIPUNCTURE: CPT | Performed by: FAMILY MEDICINE

## 2023-07-27 PROCEDURE — 85652 RBC SED RATE AUTOMATED: CPT | Performed by: FAMILY MEDICINE

## 2023-07-27 PROCEDURE — 86140 C-REACTIVE PROTEIN: CPT | Performed by: FAMILY MEDICINE

## 2023-07-27 PROCEDURE — 80048 BASIC METABOLIC PNL TOTAL CA: CPT | Performed by: FAMILY MEDICINE

## 2023-07-27 PROCEDURE — 99214 OFFICE O/P EST MOD 30 MIN: CPT | Performed by: FAMILY MEDICINE

## 2023-07-27 PROCEDURE — 80061 LIPID PANEL: CPT | Performed by: FAMILY MEDICINE

## 2023-07-27 RX ORDER — SILDENAFIL 100 MG/1
100 TABLET, FILM COATED ORAL DAILY PRN
Qty: 15 TABLET | Refills: 3 | Status: SHIPPED | OUTPATIENT
Start: 2023-07-27

## 2023-07-27 NOTE — LETTER
July 28, 2023      Bharat Moss   [address] [place] [id number]        Dear ,    We are writing to inform you of your test results.    Your test results fall within the expected range(s) or remain unchanged from previous results.  Please continue with current treatment plan.  I have sent in a prescription for prednisone.  Please let me know if this helps with your pain.    Resulted Orders   Lipid panel reflex to direct LDL Non-fasting   Result Value Ref Range    Cholesterol 220 (H) <200 mg/dL    Triglycerides 261 (H) <150 mg/dL    Direct Measure HDL 49 >=40 mg/dL    LDL Cholesterol Calculated 119 (H) <=100 mg/dL    Non HDL Cholesterol 171 (H) <130 mg/dL    Narrative    Cholesterol  Desirable:  <200 mg/dL    Triglycerides  Normal:  Less than 150 mg/dL  Borderline High:  150-199 mg/dL  High:  200-499 mg/dL  Very High:  Greater than or equal to 500 mg/dL    Direct Measure HDL  Female:  Greater than or equal to 50 mg/dL   Male:  Greater than or equal to 40 mg/dL    LDL Cholesterol  Desirable:  <100mg/dL  Above Desirable:  100-129 mg/dL   Borderline High:  130-159 mg/dL   High:  160-189 mg/dL   Very High:  >= 190 mg/dL    Non HDL Cholesterol  Desirable:  130 mg/dL  Above Desirable:  130-159 mg/dL  Borderline High:  160-189 mg/dL  High:  190-219 mg/dL  Very High:  Greater than or equal to 220 mg/dL   Basic metabolic panel  (Ca, Cl, CO2, Creat, Gluc, K, Na, BUN)   Result Value Ref Range    Sodium 145 136 - 145 mmol/L    Potassium 4.0 3.4 - 5.3 mmol/L    Chloride 107 98 - 107 mmol/L    Carbon Dioxide (CO2) 26 22 - 29 mmol/L    Anion Gap 12 7 - 15 mmol/L    Urea Nitrogen 10.9 6.0 - 20.0 mg/dL    Creatinine 0.90 0.67 - 1.17 mg/dL    Calcium 9.4 8.6 - 10.0 mg/dL    Glucose 72 70 - 99 mg/dL    GFR Estimate >90 >60 mL/min/1.73m2   CRP, inflammation   Result Value Ref Range    CRP Inflammation 4.14 <5.00 mg/L   Erythrocyte sedimentation rate auto   Result Value Ref Range    Erythrocyte Sedimentation Rate  6 0 - 20 mm/hr       If you have any questions or concerns, please call the clinic at the number listed above.       Sincerely,      Topher Varma MD

## 2023-07-27 NOTE — PROGRESS NOTES
"  Assessment & Plan     Mixed hyperlipidemia  History of mildly elevated lipids comment check labs today  - Lipid panel reflex to direct LDL Non-fasting    Muscle pain  Unclear etiology.  This has been an ongoing problem for him.  Consider inflammatory disease versus possible neurologic component.  Stop hydrochlorothiazide as symptoms may be possible side effects.  Labs as below and if inflammatory markers elevated consider prednisone trial.  - Basic metabolic panel  (Ca, Cl, CO2, Creat, Gluc, K, Na, BUN); Future  - CRP, inflammation  - Erythrocyte sedimentation rate auto    Erectile dysfunction, unspecified erectile dysfunction type  Continue with sildenafil  - sildenafil (VIAGRA) 100 MG tablet; Take 1 tablet (100 mg) by mouth daily as needed (sexual activity)    Bilateral leg edema  This has improved with hydrochlorothiazide.  He will stop the hydrochlorothiazide due to the possibility that this could be a cause of his muscle pain.  - Basic metabolic panel  (Ca, Cl, CO2, Creat, Gluc, K, Na, BUN)             BMI:   Estimated body mass index is 42.29 kg/m  as calculated from the following:    Height as of this encounter: 1.753 m (5' 9\").    Weight as of this encounter: 129.9 kg (286 lb 6.4 oz).           Topher Varma MD  [facility]    Jose G Nicholson is a 57 year old, presenting for the following health issues:  Musculoskeletal Problem (C/o bilateral leg pain, hard time standing up)        7/27/2023     4:11 PM   Additional Questions   Roomed by Michelle DUNCAN CMA   Accompanied by Self       History of Present Illness       Reason for visit:  Pain in legs  have a dificult time standing up    He eats 0-1 servings of fruits and vegetables daily.He consumes 2 sweetened beverage(s) daily.He exercises with enough effort to increase his heart rate 9 or less minutes per day.  He exercises with enough effort to increase his heart rate 3 or less days per week.   He is taking medications " "regularly.       Patient is here to discuss bilateral leg pain that he has had for the past 6 weeks.  It's getting more difficult for him to stand from a sitting position.  He denies injury to his legs.  This has been an ongoing issue over the last several months.  Investigation has been done previously when pain was more concentrated on the right than the left.  He is unsure if this is related to starting hydrochlorothiazide.        Review of Systems   Constitutional, HEENT, cardiovascular, pulmonary, gi and gu systems are negative, except as otherwise noted.      Objective    BP (!) 144/93 (BP Location: Right arm, Patient Position: Sitting, Cuff Size: Adult Large)   Pulse 75   Temp 97.4  F (36.3  C) (Tympanic)   Resp 20   Ht 1.753 m (5' 9\")   Wt 129.9 kg (286 lb 6.4 oz)   SpO2 97%   BMI 42.29 kg/m    Body mass index is 42.29 kg/m .  Physical Exam   Alert, oriented, no acute distress  Neck supple without adenopathy  Lungs are clear  Heart has a regular rate and rhythm  No muscle tenderness in the legs, normal strength, normal range of motion                      "

## 2023-07-28 LAB
ANION GAP SERPL CALCULATED.3IONS-SCNC: 12 MMOL/L (ref 7–15)
BUN SERPL-MCNC: 10.9 MG/DL (ref 6–20)
CALCIUM SERPL-MCNC: 9.4 MG/DL (ref 8.6–10)
CHLORIDE SERPL-SCNC: 107 MMOL/L (ref 98–107)
CHOLEST SERPL-MCNC: 220 MG/DL
CREAT SERPL-MCNC: 0.9 MG/DL (ref 0.67–1.17)
CRP SERPL-MCNC: 4.14 MG/L
DEPRECATED HCO3 PLAS-SCNC: 26 MMOL/L (ref 22–29)
GFR SERPL CREATININE-BSD FRML MDRD: >90 ML/MIN/1.73M2
GLUCOSE SERPL-MCNC: 72 MG/DL (ref 70–99)
HDLC SERPL-MCNC: 49 MG/DL
LDLC SERPL CALC-MCNC: 119 MG/DL
NONHDLC SERPL-MCNC: 171 MG/DL
POTASSIUM SERPL-SCNC: 4 MMOL/L (ref 3.4–5.3)
SODIUM SERPL-SCNC: 145 MMOL/L (ref 136–145)
TRIGL SERPL-MCNC: 261 MG/DL

## 2023-07-28 RX ORDER — PREDNISONE 5 MG/1
TABLET ORAL
Qty: 24 TABLET | Refills: 0 | Status: SHIPPED | OUTPATIENT
Start: 2023-07-28 | End: 2023-08-09

## 2023-08-13 ENCOUNTER — HEALTH MAINTENANCE LETTER (OUTPATIENT)
Age: 58
End: 2023-08-13

## 2023-08-16 ENCOUNTER — OFFICE VISIT (OUTPATIENT)
Dept: FAMILY MEDICINE | Facility: CLINIC | Age: 58
End: 2023-08-16
Payer: COMMERCIAL

## 2023-08-16 ENCOUNTER — ANCILLARY PROCEDURE (OUTPATIENT)
Dept: GENERAL RADIOLOGY | Facility: CLINIC | Age: 58
End: 2023-08-16
Payer: COMMERCIAL

## 2023-08-16 VITALS
BODY MASS INDEX: 42.8 KG/M2 | RESPIRATION RATE: 18 BRPM | HEIGHT: 69 IN | WEIGHT: 289 LBS | OXYGEN SATURATION: 98 % | DIASTOLIC BLOOD PRESSURE: 86 MMHG | TEMPERATURE: 98.3 F | HEART RATE: 60 BPM | SYSTOLIC BLOOD PRESSURE: 128 MMHG

## 2023-08-16 DIAGNOSIS — L03.115 CELLULITIS OF RIGHT LOWER EXTREMITY: ICD-10-CM

## 2023-08-16 DIAGNOSIS — M79.605 PAIN IN BOTH LOWER EXTREMITIES: ICD-10-CM

## 2023-08-16 DIAGNOSIS — M79.604 PAIN IN BOTH LOWER EXTREMITIES: ICD-10-CM

## 2023-08-16 PROCEDURE — 99213 OFFICE O/P EST LOW 20 MIN: CPT

## 2023-08-16 PROCEDURE — 73590 X-RAY EXAM OF LOWER LEG: CPT | Mod: TC | Performed by: RADIOLOGY

## 2023-08-16 RX ORDER — CEPHALEXIN 500 MG/1
500 CAPSULE ORAL 2 TIMES DAILY
Qty: 14 CAPSULE | Refills: 0 | Status: SHIPPED | OUTPATIENT
Start: 2023-08-16 | End: 2023-08-23

## 2023-08-16 ASSESSMENT — ENCOUNTER SYMPTOMS: LEG PAIN: 1

## 2023-08-16 NOTE — LETTER
August 16, 2023      Bharat Moss   [address] [place] [phone]        To Whom It May Concern:    Bharat Msos was seen on 8/16/23.  Please excuse him for his time absent today due to illness.      Sincerely,        MEREDITH Acuna CNP

## 2023-08-16 NOTE — PROGRESS NOTES
"  Assessment & Plan     Cellulitis of right lower extremity  Patient with what appears to be cellulitis on right mid shin.  He was hit with a softball in this area approximately 2 weeks ago and has some lingering bruising, and now has erythema with warmth to touch.  There is no red streaking, he has not had any fevers.  We will treat with oral antibiotics, and patient instructed if not improving to return to clinic.  We will also do an x-ray to make sure that no fractures were sustained with softball impact.  - cephALEXin (KEFLEX) 500 MG capsule; Take 1 capsule (500 mg) by mouth 2 times daily for 7 days    Pain in both lower extremities  Patient previously seen for this problem, he has pain in his lower extremities.  We discussed peripheral vein disease as possible cause.  Osteoarthritis also discussed.  We also discussed his edema which has not increased since hydrochlorothiazide discontinued by his PCP.  He was advised that physical therapy may help with his leg pain and this is encouraged and order placed.  Patient also given local physical therapy referral information.   patient to be notified if any concerns x-ray and plan of care adjusted as needed.  - Physical Therapy Referral; Future  - XR Tibia and Fibula Right 2 Views; Future             BMI:   Estimated body mass index is 42.68 kg/m  as calculated from the following:    Height as of this encounter: 1.753 m (5' 9\").    Weight as of this encounter: 131.1 kg (289 lb).           MEREDITH Acuna CNP [facility]    Jose G Nicholson is a 57 year old, presenting for the following health issues:  Leg Pain (Right lower leg pain x 2 weeks since hit with softball. Redness began x 3 days ago./Separate issue - pain in back of right calf up to the thigh - Requesting PT//Work note needed.)        8/16/2023     8:37 AM   Additional Questions   Roomed by Megan       History of Present Illness       Reason for visit:  Leg " "injury  Symptom onset:  1-2 weeks ago  Symptoms include:  Redness swelling pain  Symptom intensity:  Moderate  Symptom progression:  Worsening  Had these symptoms before:  No  What makes it worse:  If I bump it  What makes it better:  Not really    He eats 0-1 servings of fruits and vegetables daily.He consumes 2 sweetened beverage(s) daily.He exercises with enough effort to increase his heart rate 9 or less minutes per day.  He exercises with enough effort to increase his heart rate 3 or less days per week.   He is taking medications regularly.               Review of Systems   Constitutional, HEENT, cardiovascular, pulmonary, gi and gu systems are negative, except as otherwise noted.      Objective    /86 (BP Location: Right arm, Patient Position: Sitting, Cuff Size: Adult Large)   Pulse 60   Temp 98.3  F (36.8  C) (Oral)   Resp 18   Ht 1.753 m (5' 9\")   Wt 131.1 kg (289 lb)   SpO2 98%   BMI 42.68 kg/m    Body mass index is 42.68 kg/m .  Physical Exam  HENT:      Mouth/Throat:      Mouth: Mucous membranes are moist.   Eyes:      Extraocular Movements: Extraocular movements intact.      Conjunctiva/sclera: Conjunctivae normal.   Cardiovascular:      Rate and Rhythm: Normal rate and regular rhythm.   Pulmonary:      Effort: Pulmonary effort is normal.   Musculoskeletal:      Cervical back: Normal range of motion.      Right lower leg: Edema present.      Left lower leg: Edema present.   Lymphadenopathy:      Cervical: No cervical adenopathy.   Skin:     General: Skin is warm.      Capillary Refill: Capillary refill takes less than 2 seconds.      Findings: Erythema present.   Neurological:      Mental Status: He is alert and oriented to person, place, and time.                              "

## 2023-08-16 NOTE — PATIENT INSTRUCTIONS
[facility]   [address], [address] [place] [address]   Phone: [phone]    Shabanaage Ashu  [address] [place] [address]  [phone]

## 2023-10-23 ENCOUNTER — TELEPHONE (OUTPATIENT)
Dept: FAMILY MEDICINE | Facility: CLINIC | Age: 58
End: 2023-10-23
Payer: COMMERCIAL

## 2023-10-23 NOTE — TELEPHONE ENCOUNTER
Pt requesting order for physical therapy sent to [facility]. Saw [facility] 7/27. Pt contacted [facility] but was having problems with insurance. Call pt with any questions [phone]

## 2023-10-23 NOTE — TELEPHONE ENCOUNTER
Order from 8/16/2023 faxed to [facility] at [phone] and they will contact pt to schedule appt.

## 2024-01-29 ENCOUNTER — OFFICE VISIT (OUTPATIENT)
Dept: FAMILY MEDICINE | Facility: CLINIC | Age: 59
End: 2024-01-29
Payer: COMMERCIAL

## 2024-01-29 VITALS
HEIGHT: 69 IN | SYSTOLIC BLOOD PRESSURE: 122 MMHG | BODY MASS INDEX: 46.65 KG/M2 | TEMPERATURE: 97.6 F | RESPIRATION RATE: 18 BRPM | WEIGHT: 315 LBS | OXYGEN SATURATION: 96 % | DIASTOLIC BLOOD PRESSURE: 70 MMHG | HEART RATE: 77 BPM

## 2024-01-29 DIAGNOSIS — M79.662 PAIN IN BOTH LOWER LEGS: ICD-10-CM

## 2024-01-29 DIAGNOSIS — R05.3 PERSISTENT DRY COUGH: Primary | ICD-10-CM

## 2024-01-29 DIAGNOSIS — M79.661 PAIN IN BOTH LOWER LEGS: ICD-10-CM

## 2024-01-29 PROCEDURE — 99213 OFFICE O/P EST LOW 20 MIN: CPT | Performed by: PHYSICIAN ASSISTANT

## 2024-01-29 RX ORDER — DOXYCYCLINE HYCLATE 100 MG
100 TABLET ORAL 2 TIMES DAILY
Qty: 20 TABLET | Refills: 0 | Status: SHIPPED | OUTPATIENT
Start: 2024-01-29 | End: 2024-03-04

## 2024-01-29 ASSESSMENT — PATIENT HEALTH QUESTIONNAIRE - PHQ9
10. IF YOU CHECKED OFF ANY PROBLEMS, HOW DIFFICULT HAVE THESE PROBLEMS MADE IT FOR YOU TO DO YOUR WORK, TAKE CARE OF THINGS AT HOME, OR GET ALONG WITH OTHER PEOPLE: SOMEWHAT DIFFICULT
SUM OF ALL RESPONSES TO PHQ QUESTIONS 1-9: 6
SUM OF ALL RESPONSES TO PHQ QUESTIONS 1-9: 6

## 2024-01-29 ASSESSMENT — ASTHMA QUESTIONNAIRES
ACT_TOTALSCORE: 24
QUESTION_2 LAST FOUR WEEKS HOW OFTEN HAVE YOU HAD SHORTNESS OF BREATH: NOT AT ALL
QUESTION_3 LAST FOUR WEEKS HOW OFTEN DID YOUR ASTHMA SYMPTOMS (WHEEZING, COUGHING, SHORTNESS OF BREATH, CHEST TIGHTNESS OR PAIN) WAKE YOU UP AT NIGHT OR EARLIER THAN USUAL IN THE MORNING: ONCE OR TWICE
ACT_TOTALSCORE: 24
QUESTION_1 LAST FOUR WEEKS HOW MUCH OF THE TIME DID YOUR ASTHMA KEEP YOU FROM GETTING AS MUCH DONE AT WORK, SCHOOL OR AT HOME: NONE OF THE TIME
QUESTION_5 LAST FOUR WEEKS HOW WOULD YOU RATE YOUR ASTHMA CONTROL: COMPLETELY CONTROLLED
QUESTION_4 LAST FOUR WEEKS HOW OFTEN HAVE YOU USED YOUR RESCUE INHALER OR NEBULIZER MEDICATION (SUCH AS ALBUTEROL): NOT AT ALL

## 2024-01-29 NOTE — PROGRESS NOTES
"  Assessment & Plan   Problem List Items Addressed This Visit    None  Visit Diagnoses       Persistent dry cough    -  Primary    Relevant Medications    doxycycline hyclate (VIBRA-TABS) 100 MG tablet        Upper for atypicals offered x-ray for his knees he declines did discuss unlikely to be vascular pathology but did offer arterial Dopplers he declines    Follow-up if his cough persists he will follow-up if he changes his mind in regards to the workup for his leg symptoms       BMI  Estimated body mass index is 46.72 kg/m  as calculated from the following:    Height as of this encounter: 1.753 m (5' 9\").    Weight as of this encounter: 143.5 kg (316 lb 6.4 oz).             Jose G Nicholson is a 58 year old, presenting for the following health issues:  URI (Cough for the past month with some chest tightness ) and Leg Problem (Bilateral leg pain, pain comes and goes in different parts of the lower extremity- knees, calf, thigh etc, possible circulatory problem?)        1/29/2024     4:45 PM   Additional Questions   Roomed by SONJA Mar     58-year-old presents to the clinic with complaint of cough cough for the past month dry cough little less frequent now than when it started  No fever no chills  Little tightness in chest at time  Number of years ago he had some reactive airway disease he had an inhaler states he has not had an inhaler for a number of years  He has bilateral lower extremity complaints mainly the knees they do not bother once he is standing but he has a hard time getting to a standing position from a seated position there is been no specific injury  He wonders about a circulation issue although states he can walk for 9 hours a day without any pain or discomfort his feet do not feel cool    URI    History of Present Illness       Reason for visit:  Cold  pain in legs  Symptom onset:  3-4 weeks ago  Symptoms include:  Cold  pain in legs  hard to stand up  Symptom intensity:  Mild  Symptom " "progression:  Improving  Had these symptoms before:  Yes  Has tried/received treatment for these symptoms:  Yes  Previous treatment was successful:  No  What makes it worse:  Going down stairs  What makes it better:  No    He eats 0-1 servings of fruits and vegetables daily.He consumes 3 sweetened beverage(s) daily.He exercises with enough effort to increase his heart rate 9 or less minutes per day.  He exercises with enough effort to increase his heart rate 3 or less days per week.   He is taking medications regularly.  Answers submitted by the patient for this visit:  Patient Health Questionnaire (Submitted on 1/29/2024)  If you checked off any problems, how difficult have these problems made it for you to do your work, take care of things at home, or get along with other people?: Somewhat difficult  PHQ9 TOTAL SCORE: 6                     Objective    /70 (BP Location: Right arm, Patient Position: Sitting, Cuff Size: Adult Large)   Pulse 77   Temp 97.6  F (36.4  C) (Tympanic)   Resp 18   Ht 1.753 m (5' 9\")   Wt 143.5 kg (316 lb 6.4 oz)   SpO2 96%   BMI 46.72 kg/m    Body mass index is 46.72 kg/m .  Physical Exam  Constitutional:       Appearance: Normal appearance.   Neurological:      Mental Status: He is alert.     Ears bilateral ceruminosis noted visualized portion of TM is normal  Nasal mucosa is clear  No sinus tenderness  Eyes conjunctiva pink  Oropharynx benign  Neck supple no adenopathy  Lungs clear ventilated no wheeze rales or rhonchi  Cardiovascular get rhythm  The lower extremities are without rash without erythema he has no joint line tenderness of the knees he has minimal crepitus  Intact posterior tibial and dorsalis pedal pulses bilaterally capillary refill less than 2 seconds the extremities are warm              Signed Electronically by: NAGI Blakely    "

## 2024-02-12 ENCOUNTER — OFFICE VISIT (OUTPATIENT)
Dept: FAMILY MEDICINE | Facility: CLINIC | Age: 59
End: 2024-02-12
Payer: COMMERCIAL

## 2024-02-12 VITALS
HEIGHT: 69 IN | RESPIRATION RATE: 18 BRPM | BODY MASS INDEX: 46.45 KG/M2 | OXYGEN SATURATION: 97 % | TEMPERATURE: 96.1 F | SYSTOLIC BLOOD PRESSURE: 138 MMHG | HEART RATE: 68 BPM | WEIGHT: 313.6 LBS | DIASTOLIC BLOOD PRESSURE: 92 MMHG

## 2024-02-12 DIAGNOSIS — J40 WHEEZY BRONCHITIS: Primary | ICD-10-CM

## 2024-02-12 DIAGNOSIS — M79.662 PAIN IN BOTH LOWER LEGS: ICD-10-CM

## 2024-02-12 DIAGNOSIS — G89.29 CHRONIC PAIN OF BOTH KNEES: ICD-10-CM

## 2024-02-12 DIAGNOSIS — M79.661 PAIN IN BOTH LOWER LEGS: ICD-10-CM

## 2024-02-12 DIAGNOSIS — M25.561 CHRONIC PAIN OF BOTH KNEES: ICD-10-CM

## 2024-02-12 DIAGNOSIS — M25.562 CHRONIC PAIN OF BOTH KNEES: ICD-10-CM

## 2024-02-12 PROCEDURE — 99213 OFFICE O/P EST LOW 20 MIN: CPT | Performed by: NURSE PRACTITIONER

## 2024-02-12 RX ORDER — ALBUTEROL SULFATE 90 UG/1
2 AEROSOL, METERED RESPIRATORY (INHALATION) EVERY 6 HOURS PRN
Qty: 18 G | Refills: 0 | Status: SHIPPED | OUTPATIENT
Start: 2024-02-12 | End: 2024-05-28

## 2024-02-12 RX ORDER — PREDNISONE 20 MG/1
40 TABLET ORAL DAILY
Qty: 10 TABLET | Refills: 0 | Status: SHIPPED | OUTPATIENT
Start: 2024-02-12 | End: 2024-02-17

## 2024-02-12 ASSESSMENT — ENCOUNTER SYMPTOMS
SINUS PAIN: 0
COUGH: 1
LIGHT-HEADEDNESS: 0
RHINORRHEA: 0
HEADACHES: 0
MYALGIAS: 1
CHILLS: 0
ABDOMINAL PAIN: 0
NAUSEA: 0
SHORTNESS OF BREATH: 1
FEVER: 0
DIZZINESS: 0
WHEEZING: 0
WEAKNESS: 0
SORE THROAT: 0
SINUS PRESSURE: 0
CHEST TIGHTNESS: 1
VOMITING: 0
ARTHRALGIAS: 1

## 2024-02-12 NOTE — PROGRESS NOTES
"  Assessment & Plan     Wheezy bronchitis  Mild wheezing on exam without rales or ronchi.  History of exercise-induced asthma.  Denies any history of melena persistent asthma but has used albuterol inhaler in the past.  Was prescribed doxycycline 1/29/2024 without improvement cough.  I recommend prednisone and albuterol inhaler as below for wheezing.  - albuterol (PROAIR HFA/PROVENTIL HFA/VENTOLIN HFA) 108 (90 Base) MCG/ACT inhaler; Inhale 2 puffs into the lungs every 6 hours as needed for shortness of breath, wheezing or cough  - predniSONE (DELTASONE) 20 MG tablet; Take 2 tablets (40 mg) by mouth daily for 5 days    Pain in both lower legs  He has chronic pain in bilateral lower extremities that seems to migrate and wax and wane.  Currently worse in the knees.  Pain is worse with walking down steps.  Worse with going from sitting to standing.  Seems to improve with walking.  He has not been on his feet is much as he is not currently working.  Has put on weight and recognizes this could be contributing.  Was referred to physical therapy in the past but insurance did not cover.  He is willing to try physical therapy again.   - Physical Therapy Referral; Future    Chronic pain of both knees  Offered knee x-rays today to evaluate for osteoarthritis, but he defers.  He has trouble with going up and down stairs, going down the steps is worse than going up.  Seems to have worsened with weight gain.  We discussed importance of monitoring diet and trying to increase physical activity.  Will start with physical therapy.  Follow-up if symptoms not improving with physical therapy    - Physical Therapy Referral; Future            BMI  Estimated body mass index is 46.31 kg/m  as calculated from the following:    Height as of this encounter: 1.753 m (5' 9\").    Weight as of this encounter: 142.2 kg (313 lb 9.6 oz).             Jose G Nicholson is a 58 year old, presenting for the following health issues:  URI (Cold SX x 6 " weeks, not getting better, Pt. Was here 10 days ago and received a med. Pt. States he has not improved) and Cough (Pt. Has had a cough off and on with SOB)        2/12/2024     8:14 AM   Additional Questions   Roomed by VAN Alvarez     Bharat is a 58 year old male patient of Dr. Varma who presents today for valuation of cough onset 2 weeks ago.  States he had a cold at the beginning of the year.  Prescribed doxycycline on 1/29/2024.  States symptoms are not improving.  He felt he had worsening chest tightness last night overall started to feel worse.  States he can go hours without coughing but then will have episodes of dry cough.  Nonproductive.  He has not noticed any significant wheezing.  Reports a little bit of shortness of breath.  No fever or chills.  Has not tested for flu or COVID.  He had a flu shot and COVID shot in September 2023, so is up-to-date.  Has been using over-the-counter cough medicine at night.  He reports a history of exercise-induced asthma.  Used to have an inhaler but ran out.  He takes over-the-counter cetirizine for allergies.    Also reports chronic bilateral lower extremity pain.  Seems to travel through his legs, lower legs to knees to thighs.  Onset 8 months ago.  He was evaluated by his PCP.  Lab work unremarkable including inflammatory markers, CPK and electrolytes.  States symptoms are worse with getting up and down.  If he goes from sitting to standing.  Difficulty with stairs, going down stairs is more painful than going up stairs.  He has to take 1 step at a time.  Recently pain is localized in the knees.  He relates he is as heavy as he has ever been.  He understands this could be contributing.  Also has not been working in the last couple months.  The pain keeps him from being very active.  Pain gets better with walking.  Was referred to physical therapy but his insurance did not cover.  He does have different insurance and is willing to to try physical therapy.  Relates he  "did get a brace/arch support for his feet and that helped his ankle and foot pain greatly.              History of Present Illness       Reason for visit:  Cough tight chest    He eats 0-1 servings of fruits and vegetables daily.He consumes 2 sweetened beverage(s) daily.He exercises with enough effort to increase his heart rate 9 or less minutes per day.  He exercises with enough effort to increase his heart rate 3 or less days per week.   He is taking medications regularly.                 Review of Systems   Constitutional:  Negative for chills and fever.   HENT:  Negative for congestion, ear pain, rhinorrhea, sinus pressure, sinus pain and sore throat.    Respiratory:  Positive for cough, chest tightness and shortness of breath. Negative for wheezing.    Cardiovascular:  Positive for leg swelling. Negative for chest pain.   Gastrointestinal:  Negative for abdominal pain, nausea and vomiting.   Musculoskeletal:  Positive for arthralgias and myalgias.   Neurological:  Negative for dizziness, weakness, light-headedness and headaches.           Objective    BP (!) 138/92 (BP Location: Right arm, Patient Position: Sitting, Cuff Size: Adult Large)   Pulse 68   Temp (!) 96.1  F (35.6  C) (Tympanic)   Resp 18   Ht 1.753 m (5' 9\")   Wt 142.2 kg (313 lb 9.6 oz)   SpO2 97%   BMI 46.31 kg/m    Body mass index is 46.31 kg/m .  Physical Exam  Constitutional:       Appearance: He is obese. He is not ill-appearing or toxic-appearing.   HENT:      Head: Normocephalic and atraumatic.      Right Ear: There is impacted cerumen.      Left Ear: There is impacted cerumen.      Nose: No congestion or rhinorrhea.      Mouth/Throat:      Mouth: Mucous membranes are moist.      Pharynx: No oropharyngeal exudate or posterior oropharyngeal erythema.   Eyes:      Conjunctiva/sclera: Conjunctivae normal.      Pupils: Pupils are equal, round, and reactive to light.   Cardiovascular:      Rate and Rhythm: Normal rate and regular rhythm. "   Pulmonary:      Effort: No respiratory distress.      Breath sounds: Wheezing present. No rhonchi or rales.   Musculoskeletal:      Right lower leg: Edema (nonpitting) present.      Left lower leg: Edema (non pitting) present.   Skin:     General: Skin is warm and dry.      Capillary Refill: Capillary refill takes less than 2 seconds.   Neurological:      Mental Status: He is alert and oriented to person, place, and time.                    Signed Electronically by: MEREDITH Martin CNP

## 2024-02-29 ENCOUNTER — ANCILLARY PROCEDURE (OUTPATIENT)
Dept: GENERAL RADIOLOGY | Facility: CLINIC | Age: 59
End: 2024-02-29
Attending: FAMILY MEDICINE
Payer: COMMERCIAL

## 2024-02-29 ENCOUNTER — OFFICE VISIT (OUTPATIENT)
Dept: FAMILY MEDICINE | Facility: CLINIC | Age: 59
End: 2024-02-29
Payer: COMMERCIAL

## 2024-02-29 VITALS
OXYGEN SATURATION: 96 % | BODY MASS INDEX: 46.65 KG/M2 | DIASTOLIC BLOOD PRESSURE: 90 MMHG | SYSTOLIC BLOOD PRESSURE: 146 MMHG | TEMPERATURE: 97.3 F | WEIGHT: 315 LBS | HEIGHT: 69 IN | RESPIRATION RATE: 20 BRPM | HEART RATE: 70 BPM

## 2024-02-29 DIAGNOSIS — J40 WHEEZY BRONCHITIS: ICD-10-CM

## 2024-02-29 DIAGNOSIS — Z12.11 SCREEN FOR COLON CANCER: ICD-10-CM

## 2024-02-29 DIAGNOSIS — Z11.59 NEED FOR HEPATITIS C SCREENING TEST: ICD-10-CM

## 2024-02-29 DIAGNOSIS — Z11.4 SCREENING FOR HIV (HUMAN IMMUNODEFICIENCY VIRUS): Primary | ICD-10-CM

## 2024-02-29 DIAGNOSIS — J98.4 ACUTE PNEUMONITIS: ICD-10-CM

## 2024-02-29 LAB
BASOPHILS # BLD AUTO: 0.1 10E3/UL (ref 0–0.2)
BASOPHILS NFR BLD AUTO: 1 %
EOSINOPHIL # BLD AUTO: 0.6 10E3/UL (ref 0–0.7)
EOSINOPHIL NFR BLD AUTO: 6 %
ERYTHROCYTE [DISTWIDTH] IN BLOOD BY AUTOMATED COUNT: 13.3 % (ref 10–15)
HCT VFR BLD AUTO: 48 % (ref 40–53)
HGB BLD-MCNC: 15.4 G/DL (ref 13.3–17.7)
IMM GRANULOCYTES # BLD: 0 10E3/UL
IMM GRANULOCYTES NFR BLD: 0 %
LYMPHOCYTES # BLD AUTO: 2.2 10E3/UL (ref 0.8–5.3)
LYMPHOCYTES NFR BLD AUTO: 21 %
MCH RBC QN AUTO: 29.4 PG (ref 26.5–33)
MCHC RBC AUTO-ENTMCNC: 32.1 G/DL (ref 31.5–36.5)
MCV RBC AUTO: 92 FL (ref 78–100)
MONOCYTES # BLD AUTO: 0.7 10E3/UL (ref 0–1.3)
MONOCYTES NFR BLD AUTO: 7 %
NEUTROPHILS # BLD AUTO: 6.8 10E3/UL (ref 1.6–8.3)
NEUTROPHILS NFR BLD AUTO: 65 %
PLATELET # BLD AUTO: 222 10E3/UL (ref 150–450)
RBC # BLD AUTO: 5.23 10E6/UL (ref 4.4–5.9)
WBC # BLD AUTO: 10.4 10E3/UL (ref 4–11)

## 2024-02-29 PROCEDURE — 85025 COMPLETE CBC W/AUTO DIFF WBC: CPT | Mod: QW | Performed by: FAMILY MEDICINE

## 2024-02-29 PROCEDURE — 86803 HEPATITIS C AB TEST: CPT | Performed by: FAMILY MEDICINE

## 2024-02-29 PROCEDURE — 99214 OFFICE O/P EST MOD 30 MIN: CPT | Performed by: FAMILY MEDICINE

## 2024-02-29 PROCEDURE — 71046 X-RAY EXAM CHEST 2 VIEWS: CPT | Mod: TC | Performed by: RADIOLOGY

## 2024-02-29 PROCEDURE — 87389 HIV-1 AG W/HIV-1&-2 AB AG IA: CPT | Performed by: FAMILY MEDICINE

## 2024-02-29 PROCEDURE — 36415 COLL VENOUS BLD VENIPUNCTURE: CPT | Performed by: FAMILY MEDICINE

## 2024-02-29 RX ORDER — ALBUTEROL SULFATE 0.83 MG/ML
2.5 SOLUTION RESPIRATORY (INHALATION) EVERY 6 HOURS PRN
Qty: 90 ML | Refills: 3 | Status: SHIPPED | OUTPATIENT
Start: 2024-02-29 | End: 2024-05-28

## 2024-02-29 RX ORDER — AZITHROMYCIN 250 MG/1
TABLET, FILM COATED ORAL
Qty: 6 TABLET | Refills: 0 | Status: SHIPPED | OUTPATIENT
Start: 2024-02-29 | End: 2024-03-05

## 2024-02-29 ASSESSMENT — ENCOUNTER SYMPTOMS: COUGH: 1

## 2024-02-29 NOTE — PROGRESS NOTES
"  Assessment & Plan     Screening for HIV (human immunodeficiency virus)  - HIV Antigen Antibody Combo; Future  - HIV Antigen Antibody Combo    Need for hepatitis C screening test  - Hepatitis C Screen Reflex to HCV RNA Quant and Genotype; Future  - Hepatitis C Screen Reflex to HCV RNA Quant and Genotype    Screen for colon cancer  - Colonoscopy Screening  Referral; Future    Wheezy bronchitis  Not improving  - CBC with platelets and differential; Future  - XR Chest 2 Views; Future  - CBC with platelets and differential    Acute pneumonitis  Not improving  - albuterol (PROVENTIL) (2.5 MG/3ML) 0.083% neb solution; Take 1 vial (2.5 mg) by nebulization every 6 hours as needed for shortness of breath, wheezing or cough  - azithromycin (ZITHROMAX) 250 MG tablet; Take 2 tablets (500 mg) by mouth daily for 1 day, THEN 1 tablet (250 mg) daily for 4 days.            BMI  Estimated body mass index is 46.64 kg/m  as calculated from the following:    Height as of this encounter: 1.753 m (5' 9\").    Weight as of this encounter: 143.2 kg (315 lb 12.8 oz).             Jose G Nicholson is a 58 year old, presenting for the following health issues:  Cough (Follow up from visit on 2/12/24. Continued cough, sob, lung discomfort and fatigue./Has been using an albuterol inhaler with not much relief.)      2/29/2024     3:51 PM   Additional Questions   Roomed by Megan Agarwal    History of Present Illness       Reason for visit:  Continued illness          Acute Illness  Acute illness concerns: Cough, sob, fatigue, lung discomfort  Onset/Duration: 2 months  Symptoms:  Fever: No  Chills/Sweats: YES  Headache (location?): YES  Sinus Pressure: No  Conjunctivitis:  No  Ear Pain: no  Rhinorrhea: YES  Congestion: YES  Sore Throat: No  Cough: YES-productive  Wheeze: YES  Decreased Appetite: No  Nausea: No  Vomiting: No  Diarrhea: No  Dysuria/Freq.: No  Dysuria or Hematuria: No  Fatigue/Achiness: YES  Sick/Strep Exposure: " "No  Therapies tried and outcome: Just finished doxycycline treatment and albuterol with little relief.            Objective    BP (!) 146/90 (BP Location: Right arm, Patient Position: Sitting, Cuff Size: Adult Large)   Pulse 70   Temp 97.3  F (36.3  C) (Tympanic)   Resp 20   Ht 1.753 m (5' 9\")   Wt 143.2 kg (315 lb 12.8 oz)   SpO2 96%   BMI 46.64 kg/m    Body mass index is 46.64 kg/m .  Physical Exam   GENERAL: alert and no distress  NECK: no adenopathy, no asymmetry, masses, or scars  RESP: lungs clear to auscultation - no rales, rhonchi or wheezes  CV: regular rate and rhythm, normal S1 S2, no S3 or S4, no murmur, click or rub, no peripheral edema    CXR - Reviewed and interpreted by me ? Infiltrate on left    Recent Results (from the past 240 hour(s))   CBC with platelets and differential    Collection Time: 02/29/24  4:10 PM   Result Value Ref Range    WBC Count 10.4 4.0 - 11.0 10e3/uL    RBC Count 5.23 4.40 - 5.90 10e6/uL    Hemoglobin 15.4 13.3 - 17.7 g/dL    Hematocrit 48.0 40.0 - 53.0 %    MCV 92 78 - 100 fL    MCH 29.4 26.5 - 33.0 pg    MCHC 32.1 31.5 - 36.5 g/dL    RDW 13.3 10.0 - 15.0 %    Platelet Count 222 150 - 450 10e3/uL    % Neutrophils 65 %    % Lymphocytes 21 %    % Monocytes 7 %    % Eosinophils 6 %    % Basophils 1 %    % Immature Granulocytes 0 %    Absolute Neutrophils 6.8 1.6 - 8.3 10e3/uL    Absolute Lymphocytes 2.2 0.8 - 5.3 10e3/uL    Absolute Monocytes 0.7 0.0 - 1.3 10e3/uL    Absolute Eosinophils 0.6 0.0 - 0.7 10e3/uL    Absolute Basophils 0.1 0.0 - 0.2 10e3/uL    Absolute Immature Granulocytes 0.0 <=0.4 10e3/uL             Signed Electronically by: Guido Marin MD    "

## 2024-03-01 LAB — HIV 1+2 AB+HIV1 P24 AG SERPL QL IA: NONREACTIVE

## 2024-03-02 LAB — HCV AB SERPL QL IA: NONREACTIVE

## 2024-03-04 ENCOUNTER — NURSE TRIAGE (OUTPATIENT)
Dept: NURSING | Facility: CLINIC | Age: 59
End: 2024-03-04

## 2024-03-04 ENCOUNTER — ANCILLARY PROCEDURE (OUTPATIENT)
Dept: GENERAL RADIOLOGY | Facility: CLINIC | Age: 59
End: 2024-03-04
Attending: PHYSICIAN ASSISTANT
Payer: COMMERCIAL

## 2024-03-04 ENCOUNTER — OFFICE VISIT (OUTPATIENT)
Dept: FAMILY MEDICINE | Facility: CLINIC | Age: 59
End: 2024-03-04
Payer: COMMERCIAL

## 2024-03-04 VITALS
BODY MASS INDEX: 46.65 KG/M2 | RESPIRATION RATE: 22 BRPM | WEIGHT: 315 LBS | TEMPERATURE: 97.8 F | DIASTOLIC BLOOD PRESSURE: 78 MMHG | SYSTOLIC BLOOD PRESSURE: 136 MMHG | HEART RATE: 74 BPM | OXYGEN SATURATION: 96 % | HEIGHT: 69 IN

## 2024-03-04 DIAGNOSIS — R07.81 RIB PAIN ON LEFT SIDE: Primary | ICD-10-CM

## 2024-03-04 DIAGNOSIS — R05.2 SUBACUTE COUGH: ICD-10-CM

## 2024-03-04 DIAGNOSIS — R07.81 RIB PAIN ON LEFT SIDE: ICD-10-CM

## 2024-03-04 PROCEDURE — 99213 OFFICE O/P EST LOW 20 MIN: CPT | Performed by: PHYSICIAN ASSISTANT

## 2024-03-04 PROCEDURE — 71101 X-RAY EXAM UNILAT RIBS/CHEST: CPT | Mod: TC | Performed by: RADIOLOGY

## 2024-03-04 NOTE — PROGRESS NOTES
"  Assessment & Plan     (R07.81) Rib pain on left side  (primary encounter diagnosis)  Comment: X-rays were unremarkable Limited view of chest unremarkable he will finish his Zithromax he will use Aleve 2 tablets twice daily with food he will follow if he gets significant shortness of breath or if the pain is not well-controlled if he develops fever chills he will apply ice to the chest wall  Plan: XR Ribs & Chest Left G/E 3 Views            (R05.2) Subacute cough  Comment: See above  Plan: XR Ribs & Chest Left G/E 3 Views                    BMI  Estimated body mass index is 46.9 kg/m  as calculated from the following:    Height as of this encounter: 1.753 m (5' 9\").    Weight as of this encounter: 144.1 kg (317 lb 9.6 oz).             Jose G Nicholson is a 58 year old, presenting for the following health issues:  Follow Up (Bronchitis, left side/rib pain after doing nebulizer today, SOB/wheezing )        3/4/2024     3:17 PM   Additional Questions   Roomed by SONJA Mar     Patient brought to the clinic with complaint of left-sided rib pain came on while doing a nebulizer earlier today  Patient has had a cough for the last 4 to 6 weeks has had a couple rounds of antibiotics and some prednisone  On Sunday he thought he was doing better  His most recent antibiotics was Zithromax prior to that he had doxycycline  His cough persists but had been improving  No fever  He was doing that nebulizer and felt a sharp pain in the rib cage now if he coughs or sneezes he has a pain that almost puts him to his knees  No shortness of breath but it hurts to take a deep breath his nebulizers have been helpful    History of Present Illness       Reason for visit:  Continued illness                      Objective    BP (!) 140/80 (BP Location: Right arm, Patient Position: Sitting, Cuff Size: Adult Large)   Pulse 74   Temp 97.8  F (36.6  C) (Tympanic)   Resp 22   Ht 1.753 m (5' 9\")   Wt 144.1 kg (317 lb 9.6 oz)   SpO2 96%   " BMI 46.90 kg/m    Body mass index is 46.9 kg/m .  Physical Exam attentive no acute distress  Lungs he has some expiratory wheeze but good air exchange  Cardiovascular rate and rhythm  He has tenderness in the left inferior lateral and anterior rib cage no step-offs or crepitus noted              Signed Electronically by: NAGI Blakely

## 2024-03-04 NOTE — TELEPHONE ENCOUNTER
Nurse Triage SBAR    Is this a 2nd Level Triage? NO    Situation: Difficulty breathing    Background: patient calling, states that he has been seen several times for bronchitis.  He states today he was using his nebulizer and coughed and developed a sharp pain by his ribs.  He states it is different than any thing he has felt before.  He states he is having trouble breathing and cannot take a deep breath. He also stated that last night he did not get any sleep as he couldn't breathe while lying flat. Denies chest pain.     Assessment: Difficulty breathing    Protocol Recommended Disposition:   Go to ED Now    Recommendation: patient stated that he will go to the ED now.      N/A    LENNY WHITE RN    Does the patient meet one of the following criteria for ADS visit consideration? No    Reason for Disposition   MODERATE difficulty breathing (e.g., speaks in phrases, SOB even at rest, pulse 100-120) of new-onset or worse than normal    Additional Information   Negative: SEVERE difficulty breathing (e.g., struggling for each breath, speaks in single words, pulse > 120)   Negative: Breathing stopped and hasn't returned   Negative: Choking on something   Negative: Bluish (or gray) lips or face   Negative: Difficult to awaken or acting confused (e.g., disoriented, slurred speech)   Negative: Passed out (i.e., fainted, collapsed and was not responding)   Negative: Wheezing started suddenly after medicine, an allergic food, or bee sting   Negative: Stridor (harsh sound while breathing in)   Negative: Slow, shallow and weak breathing   Negative: Sounds like a life-threatening emergency to the triager   Negative: Chest pain   Negative: Wheezing (high pitched whistling sound) and previous asthma attacks or use of asthma medicines   Negative: Difficulty breathing and within 14 days of COVID-19 Exposure   Negative: Difficulty breathing and only present when coughing   Negative: Difficulty breathing and only from stuffy  nose   Negative: Difficulty breathing and only from stuffy nose or runny nose from common cold    Protocols used: Breathing Difficulty-A-OH

## 2024-03-06 ENCOUNTER — TELEPHONE (OUTPATIENT)
Dept: FAMILY MEDICINE | Facility: CLINIC | Age: 59
End: 2024-03-06

## 2024-03-06 DIAGNOSIS — R07.81 RIB PAIN ON LEFT SIDE: Primary | ICD-10-CM

## 2024-03-06 RX ORDER — ACETAMINOPHEN AND CODEINE PHOSPHATE 300; 30 MG/1; MG/1
1 TABLET ORAL EVERY 6 HOURS PRN
Qty: 20 TABLET | Refills: 0 | Status: SHIPPED | OUTPATIENT
Start: 2024-03-06 | End: 2024-05-28

## 2024-03-06 NOTE — TELEPHONE ENCOUNTER
Pt was seen by Anupam Damon 03/04/2024 for cough, rib pain.   Has been dealing with ongoing cough for past couple of months.  Please advise.

## 2024-03-06 NOTE — TELEPHONE ENCOUNTER
Pt states would like to discuss a few things and requesting pain meds please advise, [phone]   may leave a message triggered when cough a lot of pain, aleve is not strong enough

## 2024-03-06 NOTE — TELEPHONE ENCOUNTER
I have sent in a prescription for Tylenol with codeine.  This should be helpful for his cough and the rib pain.

## 2024-03-06 NOTE — TELEPHONE ENCOUNTER
03/06/2024    Pt called to see if PCP sent in a Rx yet? TC relayed the message from PCP regarding Rx for Tylenol with codeine. TC advised it was sent to the pharmacy today @ 3:24 pm to [facility] pharmacy in [place], [place].     Nothing else needed at this time.    Mulu Mckeon

## 2024-03-08 ENCOUNTER — OFFICE VISIT (OUTPATIENT)
Dept: FAMILY MEDICINE | Facility: CLINIC | Age: 59
End: 2024-03-08
Payer: COMMERCIAL

## 2024-03-08 ENCOUNTER — DOCUMENTATION ONLY (OUTPATIENT)
Dept: FAMILY MEDICINE | Facility: CLINIC | Age: 59
End: 2024-03-08

## 2024-03-08 VITALS
DIASTOLIC BLOOD PRESSURE: 80 MMHG | RESPIRATION RATE: 24 BRPM | OXYGEN SATURATION: 99 % | SYSTOLIC BLOOD PRESSURE: 130 MMHG | HEIGHT: 69 IN | WEIGHT: 315 LBS | BODY MASS INDEX: 46.65 KG/M2 | TEMPERATURE: 97.2 F | HEART RATE: 69 BPM

## 2024-03-08 DIAGNOSIS — R05.3 PERSISTENT DRY COUGH: Primary | ICD-10-CM

## 2024-03-08 DIAGNOSIS — R07.81 RIB PAIN ON LEFT SIDE: ICD-10-CM

## 2024-03-08 PROCEDURE — 99213 OFFICE O/P EST LOW 20 MIN: CPT | Performed by: PHYSICIAN ASSISTANT

## 2024-03-08 RX ORDER — PREDNISONE 20 MG/1
TABLET ORAL
Qty: 20 TABLET | Refills: 0 | Status: SHIPPED | OUTPATIENT
Start: 2024-03-08 | End: 2024-03-24

## 2024-03-08 RX ORDER — HYDROCODONE BITARTRATE AND ACETAMINOPHEN 5; 325 MG/1; MG/1
1 TABLET ORAL EVERY 6 HOURS PRN
Qty: 12 TABLET | Refills: 0 | Status: SHIPPED | OUTPATIENT
Start: 2024-03-08 | End: 2024-03-12

## 2024-03-08 NOTE — PROGRESS NOTES
"  Assessment & Plan     (R05.3) Persistent dry cough  (primary encounter diagnosis)  Comment: Will cover with prednisone burst and taper will use Augmentin added Norco for his pain he should slowly steadily improve not acutely worsen  Plan: amoxicillin-clavulanate (AUGMENTIN) 875-125 MG         tablet, predniSONE (DELTASONE) 20 MG tablet            (R07.81) Rib pain on left side  Comment:   Plan: HYDROcodone-acetaminophen (NORCO) 5-325 MG         tablet                    BMI  Estimated body mass index is 47.76 kg/m  as calculated from the following:    Height as of this encounter: 1.753 m (5' 9\").    Weight as of this encounter: 146.7 kg (323 lb 6.4 oz).             Jose G Nicholson is a 58 year old, presenting for the following health issues:  Follow Up (Cough, wheezing/SOB, and left side rib pain, not sleeping at night )        3/8/2024     9:22 AM   Additional Questions   Roomed by SONJA Mar     Patient presents to clinic with complaint of ongoing cough and wheeze.  He still has rib pain if he sits slightly leaning forward he has no rib pain if he coughs or sneezes he has increased rib pain he has no exertional pain  He is finished with the Zithromax  He has had a chest x-ray was unremarkable his rib films visualized portion of her chest was clear no cardiomegaly no rib fracture cough is mainly nonproductive but he does not like the cough because of his pain codeine has not been helpful    History of Present Illness       Reason for visit:  Continued illness                      Objective    /80 (BP Location: Right arm, Patient Position: Sitting, Cuff Size: Adult Large)   Pulse 69   Temp 97.2  F (36.2  C) (Tympanic)   Resp 24   Ht 1.753 m (5' 9\")   Wt 146.7 kg (323 lb 6.4 oz)   SpO2 99%   BMI 47.76 kg/m    Body mass index is 47.76 kg/m .  Physical Exam ears moderate cerumen bilaterally  Nasal mucosa clear  Oropharynx benign  Neck supple no adenopathy  Lungs he has audible wheeze and expiratory " air exchange no rhonchi  Cardiovascular regular rate and rhythm              Signed Electronically by: NAGI Blakely

## 2024-03-12 DIAGNOSIS — R07.81 RIB PAIN ON LEFT SIDE: ICD-10-CM

## 2024-03-12 RX ORDER — HYDROCODONE BITARTRATE AND ACETAMINOPHEN 5; 325 MG/1; MG/1
1 TABLET ORAL EVERY 6 HOURS PRN
Qty: 12 TABLET | Refills: 0 | Status: CANCELLED | OUTPATIENT
Start: 2024-03-12

## 2024-03-12 RX ORDER — HYDROCODONE BITARTRATE AND ACETAMINOPHEN 5; 325 MG/1; MG/1
1 TABLET ORAL EVERY 6 HOURS PRN
Qty: 12 TABLET | Refills: 0 | Status: SHIPPED | OUTPATIENT
Start: 2024-03-12 | End: 2024-05-14

## 2024-03-12 NOTE — TELEPHONE ENCOUNTER
Medication Question or Refill    Contacts         Type Contact Phone/Fax    03/12/2024 04:26 PM CDT Phone (Incoming) Bharat Moss (Self) [phone] (M)            What medication are you calling about (include dose and sig)?: HYDROcodone-acetaminophen (NORCO) 5-325 MG tablet     Preferred Pharmacy:   [facility] [address] [place] [address] [address]  [address] [facility] [address]  Phone: [phone] Fax: [phone]      Controlled Substance Agreement on file:   CSA -- Patient Level:    CSA: None found at the patient level.       Who prescribed the medication?: Anupam Damon    Do you need a refill? Yes -pt still has pain    Do you have any questions or concerns?  No      Could we send this information to you in [facility] or would you prefer to receive a phone call?:   Patient would prefer a phone call   Okay to leave a detailed message?: Yes at Cell number on file:    Telephone Information:   Mobile [phone]

## 2024-03-25 ENCOUNTER — TELEPHONE (OUTPATIENT)
Dept: FAMILY MEDICINE | Facility: CLINIC | Age: 59
End: 2024-03-25

## 2024-03-25 NOTE — TELEPHONE ENCOUNTER
I have refilled his Augmentin x one week. No more prednisone. He should see his primary this week if not continuing to improve, or if worsening.    KAH

## 2024-03-25 NOTE — TELEPHONE ENCOUNTER
Medication Question or Refill    Contacts         Type Contact Phone/Fax    03/25/2024 10:45 AM CDT Phone (Incoming) Bharat Moss (Self) [phone] (M)            What medication are you calling about (include dose and sig)?:   Irish clav 125 mg  Prednisone 20 mg  Albuterol for nebulizer        Preferred Pharmacy:       [facility] [address] [place] [address] [id number]  [id number]  [facility] [id number]  Phone: [phone] Fax: [phone]      Controlled Substance Agreement on file:   CSA -- Patient Level:    CSA: None found at the patient level.       Who prescribed the medication?: Anupam Davidcesar    Do you need a refill? Yes    When did you use the medication last? Friday 3/22    Patient offered an appointment? No    Do you have any questions or concerns?  Yes: symptoms have been going on for over a month. It was getting better until he ran out of medication and now it's all back       Could we send this information to you in [facility] or would you prefer to receive a phone call?:   Patient would prefer a phone call   Okay to leave a detailed message?: Yes at Cell number on file:    Telephone Information:   Mobile [phone]

## 2024-03-25 NOTE — TELEPHONE ENCOUNTER
S-(situation): RN called patient in regards to request     B-(background): Patient has been seen in clinic for persistent dry cough, several times over the past few months. He was given Amoxicillin and Prednisone at last visit on 3/8/24, He is also using Albuterol inhaler and nebulizer.     A-(assessment): Patient states that he was feeling better, but then ran out of medications on Friday 3/22. He was not able to even sleep over the weekend due to the dry cough. He said he is not worsening, but not improving since running out of the medication.     R-(recommendations): RN advised appointment to follow up, patient declines at this time and would like message routed to Anupam to see if he can send in more of the antibiotic and steroid. Advised ED right away if breathing difficulty occurs. Patient states understanding and agrees with plan. RN let patient know that Anupam was O/C but will route message to him to advise.

## 2024-03-27 ENCOUNTER — OFFICE VISIT (OUTPATIENT)
Dept: FAMILY MEDICINE | Facility: CLINIC | Age: 59
End: 2024-03-27
Payer: COMMERCIAL

## 2024-03-27 VITALS
RESPIRATION RATE: 16 BRPM | OXYGEN SATURATION: 97 % | TEMPERATURE: 97.3 F | HEIGHT: 69 IN | HEART RATE: 74 BPM | SYSTOLIC BLOOD PRESSURE: 126 MMHG | BODY MASS INDEX: 46.65 KG/M2 | DIASTOLIC BLOOD PRESSURE: 87 MMHG | WEIGHT: 315 LBS

## 2024-03-27 DIAGNOSIS — R05.3 PERSISTENT DRY COUGH: Primary | ICD-10-CM

## 2024-03-27 PROCEDURE — 99213 OFFICE O/P EST LOW 20 MIN: CPT

## 2024-03-27 RX ORDER — FLUTICASONE PROPIONATE AND SALMETEROL 250; 50 UG/1; UG/1
1 POWDER RESPIRATORY (INHALATION) 2 TIMES DAILY
Qty: 14 EACH | Refills: 0 | Status: SHIPPED | OUTPATIENT
Start: 2024-03-27 | End: 2024-05-28

## 2024-03-27 ASSESSMENT — ENCOUNTER SYMPTOMS: COUGH: 1

## 2024-05-14 ENCOUNTER — OFFICE VISIT (OUTPATIENT)
Dept: FAMILY MEDICINE | Facility: CLINIC | Age: 59
End: 2024-05-14
Payer: COMMERCIAL

## 2024-05-14 ENCOUNTER — NURSE TRIAGE (OUTPATIENT)
Dept: NURSING | Facility: CLINIC | Age: 59
End: 2024-05-14

## 2024-05-14 VITALS
DIASTOLIC BLOOD PRESSURE: 65 MMHG | TEMPERATURE: 102.3 F | SYSTOLIC BLOOD PRESSURE: 96 MMHG | WEIGHT: 315 LBS | HEIGHT: 69 IN | OXYGEN SATURATION: 97 % | HEART RATE: 82 BPM | BODY MASS INDEX: 46.65 KG/M2 | RESPIRATION RATE: 20 BRPM

## 2024-05-14 DIAGNOSIS — R05.1 ACUTE COUGH: ICD-10-CM

## 2024-05-14 DIAGNOSIS — R50.9 FEVER, UNSPECIFIED FEVER CAUSE: Primary | ICD-10-CM

## 2024-05-14 PROCEDURE — 87635 SARS-COV-2 COVID-19 AMP PRB: CPT | Performed by: FAMILY MEDICINE

## 2024-05-14 PROCEDURE — 99214 OFFICE O/P EST MOD 30 MIN: CPT | Performed by: FAMILY MEDICINE

## 2024-05-14 RX ORDER — AZITHROMYCIN 250 MG/1
TABLET, FILM COATED ORAL
Qty: 6 TABLET | Refills: 0 | Status: SHIPPED | OUTPATIENT
Start: 2024-05-14 | End: 2024-05-19

## 2024-05-14 NOTE — TELEPHONE ENCOUNTER
Spoke to patient, he declines urgent care, would like to see primary only.  RN was able to schedule him with Dr. Varma this afternoon, at 4:20 pm. Advised ED right away if he has worsening symptoms, SOB, or difficulty breathing. Patient states understanding and agrees with plan.

## 2024-05-14 NOTE — TELEPHONE ENCOUNTER
Nurse Triage SBAR    Is this a 2nd Level Triage? YES, LICENSED PRACTITIONER REVIEW IS REQUIRED    Situation: sudden onset cough on 5/12/24  Shaking chills- sweating  Cough- mostly non productive  using nebulizer and inhaler  Was unable to work yesterday or today  Extremely fatigued  wheezing    Background: Hx of asthma    Assessment:   Denies;  Severe difficulty breathing/SOB  Chest pain/pressure  Bluish lips/mouth     According to the protocol, patient should go to ED/UC now or 2LT.  Care advice given. Patient verbalizes understanding and states would rather see his PCP.     Protocol Recommended Disposition:   Go To ED/UCC Now (Or To Office With PCP Approval)    Recommendation: No appts available today.  Please advise Pt at [phone] as soon as possible.    Routed to provider    Does the patient meet one of the following criteria for ADS visit consideration? 16+ years old, with an [facility] PCP     TIP  Providers, please consider if this condition is appropriate for management at one of our Acute and Diagnostic Services sites.     If patient is a good candidate, please use dotphrase <dot>triageresponse and select Refer to ADS to document.    Analy Powers RN, Nurse Advisor 12:18 PM 5/14/2024  Reason for Disposition   Patient sounds very sick or weak to the triager    Additional Information   Negative: Bluish (or gray) lips or face   Negative: SEVERE difficulty breathing (e.g., struggling for each breath, speaks in single words)   Negative: Rapid onset of cough and has hives   Negative: Coughing started suddenly after medicine, an allergic food or bee sting   Negative: Difficulty breathing after exposure to flames, smoke, or fumes   Negative: Sounds like a life-threatening emergency to the triager   Negative: Previous asthma attacks and this feels like asthma attack   Negative: Dry cough (non-productive; no sputum or minimal clear sputum) and within 14 days of COVID-19 Exposure   Negative: MODERATE difficulty  breathing (e.g., speaks in phrases, SOB even at rest, pulse 100-120) and still present when not coughing   Negative: Chest pain present when not coughing   Negative: Passed out (i.e., fainted, collapsed and was not responding)    Protocols used: Cough-A-OH

## 2024-05-14 NOTE — PROGRESS NOTES
"  Assessment & Plan     Fever, unspecified fever cause  Patient be treated as clean acquired pneumonia.  Treatment should also cover potential for strep given his wife's positive test.  Continue with fevers therapy and symptomatic care.  He needs to follow-up if not improving in the next 48 hours or sooner if symptoms worsen.  Advise ER visit if he worsens.  - amoxicillin-clavulanate (AUGMENTIN) 875-125 MG tablet; Take 1 tablet by mouth 2 times daily  - azithromycin (ZITHROMAX) 250 MG tablet; Take 2 tablets (500 mg) by mouth daily for 1 day, THEN 1 tablet (250 mg) daily for 4 days.  - Symptomatic COVID-19 Virus (Coronavirus) by PCR    Acute cough  - amoxicillin-clavulanate (AUGMENTIN) 875-125 MG tablet; Take 1 tablet by mouth 2 times daily  - azithromycin (ZITHROMAX) 250 MG tablet; Take 2 tablets (500 mg) by mouth daily for 1 day, THEN 1 tablet (250 mg) daily for 4 days.            BMI  Estimated body mass index is 46.64 kg/m  as calculated from the following:    Height as of this encounter: 1.753 m (5' 9\").    Weight as of this encounter: 143.2 kg (315 lb 12.8 oz).             Jose G Nicholson is a 58 year old, presenting for the following health issues:  Fatigue (C/o feeling fatigued, hot/cold sweats.   Exposed to strep at home.   Also needs note for work--missed work today and tomorrow)        5/14/2024     4:22 PM   Additional Questions   Roomed by Michelle DUNCAN CMA   Accompanied by Self     History of Present Illness       Reason for visit:  Hot cold    He eats 0-1 servings of fruits and vegetables daily.He consumes 1 sweetened beverage(s) daily.He exercises with enough effort to increase his heart rate 9 or less minutes per day.  He exercises with enough effort to increase his heart rate 3 or less days per week.   He is taking medications regularly.       Acute Illness  Acute illness concerns: Cough, fever, strep exposure  Onset/Duration: 3 days  Symptoms:  Fever: unsure--doesn't have thermometer at " "home  Chills/Sweats: YES  Headache (location?): on/off, very minor  Sinus Pressure: No  Conjunctivitis:  No  Ear Pain: no  Rhinorrhea: YES  Congestion: No  Sore Throat: No  Cough: YES - once in a while  Wheeze: No  Decreased Appetite: YES  Nausea: No  Vomiting: No  Diarrhea: No  Dysuria/Freq.: No  Dysuria or Hematuria: No  Fatigue/Achiness: YES  Sick/Strep Exposure: YES- wife diagnosed with strep yesterday  Therapies tried and outcome: None    Patient is here with acute onset of fever, chills, sweats, fatigue.  Cough is nonproductive.  His wife was recently diagnosed with strep and treated.  He notes that his sons graduation and medicine over the weekend.            Objective    BP 96/65 (BP Location: Right arm, Patient Position: Sitting, Cuff Size: Adult Large)   Pulse 82   Temp (!) 102.3  F (39.1  C) (Tympanic)   Resp 20   Ht 1.753 m (5' 9\")   Wt 143.2 kg (315 lb 12.8 oz)   SpO2 97%   BMI 46.64 kg/m    Body mass index is 46.64 kg/m .  Physical Exam   Alert, oriented, appears ill  Ear canals are patent, TMs clear  Throat slightly erythematous  Neck is supple without adenopathy  Lungs have decreased breath sounds, crackles in the right base  Heart has a regular rate and rhythm            Signed Electronically by: Topher Varma MD    "

## 2024-05-14 NOTE — LETTER
May 14, 2024      Bharat Moss   [address] [place] [id number]        To Whom It May Concern:    Bharat Moss  was seen on 05/14/2024.  Please excuse him  until 05/20/2024 due to illness.        Sincerely,        Topher Varma MD

## 2024-05-15 LAB — SARS-COV-2 RNA RESP QL NAA+PROBE: NEGATIVE

## 2024-05-28 ENCOUNTER — OFFICE VISIT (OUTPATIENT)
Dept: FAMILY MEDICINE | Facility: CLINIC | Age: 59
End: 2024-05-28
Payer: COMMERCIAL

## 2024-05-28 VITALS
BODY MASS INDEX: 45.03 KG/M2 | OXYGEN SATURATION: 99 % | RESPIRATION RATE: 20 BRPM | WEIGHT: 304 LBS | HEIGHT: 69 IN | HEART RATE: 72 BPM | DIASTOLIC BLOOD PRESSURE: 83 MMHG | SYSTOLIC BLOOD PRESSURE: 118 MMHG | TEMPERATURE: 96 F

## 2024-05-28 DIAGNOSIS — J98.4 ACUTE PNEUMONITIS: ICD-10-CM

## 2024-05-28 DIAGNOSIS — J40 WHEEZY BRONCHITIS: ICD-10-CM

## 2024-05-28 DIAGNOSIS — R05.3 PERSISTENT DRY COUGH: ICD-10-CM

## 2024-05-28 PROCEDURE — 99213 OFFICE O/P EST LOW 20 MIN: CPT | Performed by: PHYSICIAN ASSISTANT

## 2024-05-28 RX ORDER — FLUTICASONE PROPIONATE AND SALMETEROL 250; 50 UG/1; UG/1
1 POWDER RESPIRATORY (INHALATION) 2 TIMES DAILY
Qty: 60 EACH | Refills: 3 | Status: SHIPPED | OUTPATIENT
Start: 2024-05-28 | End: 2024-05-28

## 2024-05-28 RX ORDER — PREDNISONE 20 MG/1
40 TABLET ORAL DAILY
Qty: 14 TABLET | Refills: 0 | Status: SHIPPED | OUTPATIENT
Start: 2024-05-28 | End: 2024-06-04

## 2024-05-28 RX ORDER — ALBUTEROL SULFATE 0.83 MG/ML
2.5 SOLUTION RESPIRATORY (INHALATION) EVERY 6 HOURS PRN
Qty: 90 ML | Refills: 3 | Status: SHIPPED | OUTPATIENT
Start: 2024-05-28 | End: 2024-05-28

## 2024-05-28 RX ORDER — ALBUTEROL SULFATE 90 UG/1
2 AEROSOL, METERED RESPIRATORY (INHALATION) EVERY 6 HOURS PRN
Qty: 18 G | Refills: 3 | Status: SHIPPED | OUTPATIENT
Start: 2024-05-28

## 2024-05-28 RX ORDER — FLUTICASONE PROPIONATE AND SALMETEROL 250; 50 UG/1; UG/1
1 POWDER RESPIRATORY (INHALATION) 2 TIMES DAILY
Qty: 60 EACH | Refills: 3 | Status: SHIPPED | OUTPATIENT
Start: 2024-05-28

## 2024-05-28 RX ORDER — ALBUTEROL SULFATE 0.83 MG/ML
2.5 SOLUTION RESPIRATORY (INHALATION) EVERY 6 HOURS PRN
Qty: 90 ML | Refills: 3 | Status: SHIPPED | OUTPATIENT
Start: 2024-05-28

## 2024-05-28 RX ORDER — ALBUTEROL SULFATE 90 UG/1
2 AEROSOL, METERED RESPIRATORY (INHALATION) EVERY 6 HOURS PRN
Qty: 18 G | Refills: 3 | Status: SHIPPED | OUTPATIENT
Start: 2024-05-28 | End: 2024-05-28

## 2024-05-28 ASSESSMENT — ENCOUNTER SYMPTOMS: COUGH: 1

## 2024-05-28 NOTE — PROGRESS NOTES
"  Assessment & Plan     (R05.3) Persistent dry cough  Comment: Continuing  Plan: predniSONE (DELTASONE) 20 MG tablet,         fluticasone-salmeterol (ADVAIR) 250-50 MCG/ACT         inhaler, DISCONTINUED: fluticasone-salmeterol         (ADVAIR) 250-50 MCG/ACT inhaler        Continue on the Advair and albuterol will give a burst of prednisone    (J40) Wheezy bronchitis  Comment: See above  Plan: albuterol (PROAIR HFA/PROVENTIL HFA/VENTOLIN         HFA) 108 (90 Base) MCG/ACT inhaler,         DISCONTINUED: albuterol (PROAIR HFA/PROVENTIL         HFA/VENTOLIN HFA) 108 (90 Base) MCG/ACT inhaler        If not improved will get into pulmonary he was instructed to MyChart or call if not improving    (J98.4) Acute pneumonitis  Comment:   Plan: albuterol (PROVENTIL) (2.5 MG/3ML) 0.083% neb         solution, DISCONTINUED: albuterol (PROVENTIL)         (2.5 MG/3ML) 0.083% neb solution                  BMI  Estimated body mass index is 44.89 kg/m  as calculated from the following:    Height as of this encounter: 1.753 m (5' 9\").    Weight as of this encounter: 137.9 kg (304 lb).             Jose G Nicholson is a 58 year old, presenting for the following health issues:  Cough (Pt here for Follow-up on cough and fever from 5/14.  Pt states he still has productive cough with some \"yellow\" sputum)      5/28/2024     1:43 PM   Additional Questions   Roomed by Gavin CASILLAS     58-year-old friends the clinic with complaint of ongoing cough.  He occasionally has fever and chills  He occasionally has a productive cough that was occasionally productive of some yellow sputum  He has not had any weight loss  Over the last 4 to 6 months has had this recurrent cough he has not had a history of it prior  The addition of the Advair did not make much difference  About 12 days ago he saw his primary Dr. Varma he was given dual antibiotic coverage Augmentin and Zithromax he finished those he feels better but the cough persists  He denies reflux " "type symptoms  He does have seasonal allergies he takes an over-the-counter loratadine there is been no purulent rhinorrhea  He no longer has the left-sided rib pain he has no chest pain    History of Present Illness       Reason for visit:  Same as the last few visits - lungs    He eats 0-1 servings of fruits and vegetables daily.He consumes 2 sweetened beverage(s) daily.He exercises with enough effort to increase his heart rate 9 or less minutes per day.  He exercises with enough effort to increase his heart rate 3 or less days per week.   He is taking medications regularly.                     Objective    /83 (BP Location: Right arm, Patient Position: Sitting, Cuff Size: Adult Large)   Pulse 72   Temp (!) 96  F (35.6  C) (Tympanic)   Resp 20   Ht 1.753 m (5' 9\")   Wt 137.9 kg (304 lb)   SpO2 99%   BMI 44.89 kg/m    Body mass index is 44.89 kg/m .  Physical Exam alert attentive  Ears occluded with cerumen bilaterally  Neck conjunctiva pink  Nasal mucosa is mildly inflamed there is clear rhinorrhea no purulence  Oropharynx pink moist no injection no exudate  Neck supple no adenopathy  Lungs appear clear and well ventilated no wheeze rales or rhonchi  Cardiovascular regular rate and rhythm              Signed Electronically by: NAGI Blakely    "

## 2024-06-06 ENCOUNTER — TELEPHONE (OUTPATIENT)
Dept: FAMILY MEDICINE | Facility: CLINIC | Age: 59
End: 2024-06-06
Payer: COMMERCIAL

## 2024-06-06 ENCOUNTER — TELEPHONE (OUTPATIENT)
Dept: NURSING | Facility: CLINIC | Age: 59
End: 2024-06-06
Payer: COMMERCIAL

## 2024-06-06 DIAGNOSIS — J40 WHEEZY BRONCHITIS: Primary | ICD-10-CM

## 2024-06-06 DIAGNOSIS — J40 WHEEZY BRONCHITIS: ICD-10-CM

## 2024-06-06 RX ORDER — PREDNISONE 20 MG/1
TABLET ORAL
Qty: 12 TABLET | Refills: 0 | Status: SHIPPED | OUTPATIENT
Start: 2024-06-06 | End: 2024-06-18

## 2024-06-06 RX ORDER — PREDNISONE 20 MG/1
TABLET ORAL
Qty: 9 TABLET | Refills: 0 | Status: SHIPPED | OUTPATIENT
Start: 2024-06-06 | End: 2024-06-06

## 2024-06-06 NOTE — TELEPHONE ENCOUNTER
Telephone call   Ran out of the prednisone  on Monday he felt pretty good on Tuesday and then yesterday afternoon he started feeling  lethargy.  coughing is improved but patient is wondering about if he should have another  round of the prednisone.  He asked if Dr Damon could call him because he was the one who saw him on 5/28/2024    Nickie Leung RN   [facility] Nurse Advisor  11:46 AM 6/6/2024

## 2024-06-06 NOTE — TELEPHONE ENCOUNTER
General Call      Reason for Call:  medication quantity question    What are your questions or concerns:  Pharmacist called and stated the below Rx that was sent today has the wrong quantity amount.  The Rx was for 9 tablets and pharmacist states it should be for 12 tablets.      Pharmacist states provider can either send a new Rx or call them back to discuss the quantity.    predniSONE (DELTASONE) 20 MG tablet 9 tablet 0 6/6/2024 6/18/2024 No   Sig - Route: Take 1.5 tablets (30 mg) by mouth daily for 4 days, THEN 1 tablet (20 mg) daily for 4 days, THEN 0.5 tablets (10 mg) daily for 4 days. - Oral     Mulu Mckeon

## 2024-06-06 NOTE — TELEPHONE ENCOUNTER
I did talk to him. I sent in a short burst and taper of prednisone. If still symptomatic when finished, or if worse prior, will refer to Pulmonary.    KAH

## 2024-06-28 ENCOUNTER — TELEPHONE (OUTPATIENT)
Dept: FAMILY MEDICINE | Facility: CLINIC | Age: 59
End: 2024-06-28
Payer: COMMERCIAL

## 2024-06-28 NOTE — TELEPHONE ENCOUNTER
S-(situation): Decreased energy since completing prednisone. Pt requesting a similar medication to improve energy.     B-(background): Last OV 5/28/24 persistent cough, bronchitis, pneumonitis.     A-(assessment): Pt states his breathing and cough are good. Has not used inhaler or nebulizer for wheezing. Pt states his energy is down and his mood has been down for awhile.     R-(recommendations): Visit to discuss energy and mood. Pt agrees and virtual visit was scheduled. Pt will call back with questions or symptoms. Discussed prednisone benefits and side effects and medication is not intended for daily ongoing use; pt verbalized understanding.

## 2024-06-28 NOTE — TELEPHONE ENCOUNTER
General Call    Contacts       Contact Date/Time Type Contact Phone/Fax    06/28/2024 10:41 AM CDT Phone (Incoming) Bharat Moss (Self) [phone] (M)          Reason for Call:  patient has questions regarding prednisone. He wants to talk with Anupam Damon    What are your questions or concerns:  Is there something different he can prescribe to help with depleted energy levels after coming off the prednisone.    Date of last appointment with provider: 5/28/2024    Could we send this information to you in [employer] [facility] or would you prefer to receive a phone call?:   Patient would prefer a phone call   Okay to leave a detailed message?: Yes at Home number on file [phone] ([place])

## 2024-07-01 ENCOUNTER — MYC MEDICAL ADVICE (OUTPATIENT)
Dept: FAMILY MEDICINE | Facility: CLINIC | Age: 59
End: 2024-07-01

## 2024-07-01 NOTE — TELEPHONE ENCOUNTER
Called and spoke with patient, per Anupam he would like him to see either Dr. Oconnor, Dr. Varma, or Dr. Melara to see if there is something else going on.     Patient states understanding and would like to call back to reschedule at a later time, since he is at work.

## 2024-07-11 ENCOUNTER — OFFICE VISIT (OUTPATIENT)
Dept: FAMILY MEDICINE | Facility: CLINIC | Age: 59
End: 2024-07-11
Payer: COMMERCIAL

## 2024-07-11 VITALS
HEIGHT: 69 IN | BODY MASS INDEX: 46.65 KG/M2 | RESPIRATION RATE: 20 BRPM | DIASTOLIC BLOOD PRESSURE: 96 MMHG | WEIGHT: 315 LBS | TEMPERATURE: 96.6 F | HEART RATE: 72 BPM | OXYGEN SATURATION: 97 % | SYSTOLIC BLOOD PRESSURE: 143 MMHG

## 2024-07-11 DIAGNOSIS — R53.83 FATIGUE, UNSPECIFIED TYPE: Primary | ICD-10-CM

## 2024-07-11 DIAGNOSIS — F34.1 DYSTHYMIA: ICD-10-CM

## 2024-07-11 LAB
ALBUMIN SERPL BCG-MCNC: 4.2 G/DL (ref 3.5–5.2)
ALP SERPL-CCNC: 80 U/L (ref 40–150)
ALT SERPL W P-5'-P-CCNC: 41 U/L (ref 0–70)
ANION GAP SERPL CALCULATED.3IONS-SCNC: 8 MMOL/L (ref 7–15)
AST SERPL W P-5'-P-CCNC: 39 U/L (ref 0–45)
BILIRUB SERPL-MCNC: 0.8 MG/DL
BUN SERPL-MCNC: 9.5 MG/DL (ref 6–20)
CALCIUM SERPL-MCNC: 9.2 MG/DL (ref 8.6–10)
CHLORIDE SERPL-SCNC: 106 MMOL/L (ref 98–107)
CREAT SERPL-MCNC: 1.02 MG/DL (ref 0.67–1.17)
DEPRECATED HCO3 PLAS-SCNC: 27 MMOL/L (ref 22–29)
EGFRCR SERPLBLD CKD-EPI 2021: 86 ML/MIN/1.73M2
GLUCOSE SERPL-MCNC: 108 MG/DL (ref 70–99)
HBA1C MFR BLD: 5.4 % (ref 0–5.6)
POTASSIUM SERPL-SCNC: 4.7 MMOL/L (ref 3.4–5.3)
PROT SERPL-MCNC: 7.1 G/DL (ref 6.4–8.3)
SODIUM SERPL-SCNC: 141 MMOL/L (ref 135–145)
TSH SERPL DL<=0.005 MIU/L-ACNC: 1.59 UIU/ML (ref 0.3–4.2)

## 2024-07-11 PROCEDURE — G2211 COMPLEX E/M VISIT ADD ON: HCPCS | Performed by: FAMILY MEDICINE

## 2024-07-11 PROCEDURE — 84443 ASSAY THYROID STIM HORMONE: CPT | Performed by: FAMILY MEDICINE

## 2024-07-11 PROCEDURE — 80053 COMPREHEN METABOLIC PANEL: CPT | Performed by: FAMILY MEDICINE

## 2024-07-11 PROCEDURE — 83036 HEMOGLOBIN GLYCOSYLATED A1C: CPT | Performed by: FAMILY MEDICINE

## 2024-07-11 PROCEDURE — 36415 COLL VENOUS BLD VENIPUNCTURE: CPT | Performed by: FAMILY MEDICINE

## 2024-07-11 PROCEDURE — 99214 OFFICE O/P EST MOD 30 MIN: CPT | Performed by: FAMILY MEDICINE

## 2024-07-11 ASSESSMENT — ENCOUNTER SYMPTOMS
BACK PAIN: 1
MYALGIAS: 1
FATIGUE: 1
APNEA: 1
EYES NEGATIVE: 1
ACTIVITY CHANGE: 1
APPETITE CHANGE: 0
JOINT SWELLING: 0
NERVOUS/ANXIOUS: 1
ARTHRALGIAS: 0
POLYDIPSIA: 0
ALLERGIC/IMMUNOLOGIC NEGATIVE: 1
WHEEZING: 0
UNEXPECTED WEIGHT CHANGE: 0
CARDIOVASCULAR NEGATIVE: 1
LIGHT-HEADEDNESS: 0
SHORTNESS OF BREATH: 0
SLEEP DISTURBANCE: 1
WEAKNESS: 0
GASTROINTESTINAL NEGATIVE: 1

## 2024-07-11 ASSESSMENT — ANXIETY QUESTIONNAIRES
2. NOT BEING ABLE TO STOP OR CONTROL WORRYING: SEVERAL DAYS
4. TROUBLE RELAXING: SEVERAL DAYS
GAD7 TOTAL SCORE: 5
GAD7 TOTAL SCORE: 5
7. FEELING AFRAID AS IF SOMETHING AWFUL MIGHT HAPPEN: NOT AT ALL
7. FEELING AFRAID AS IF SOMETHING AWFUL MIGHT HAPPEN: NOT AT ALL
IF YOU CHECKED OFF ANY PROBLEMS ON THIS QUESTIONNAIRE, HOW DIFFICULT HAVE THESE PROBLEMS MADE IT FOR YOU TO DO YOUR WORK, TAKE CARE OF THINGS AT HOME, OR GET ALONG WITH OTHER PEOPLE: SOMEWHAT DIFFICULT
5. BEING SO RESTLESS THAT IT IS HARD TO SIT STILL: SEVERAL DAYS
6. BECOMING EASILY ANNOYED OR IRRITABLE: NOT AT ALL
8. IF YOU CHECKED OFF ANY PROBLEMS, HOW DIFFICULT HAVE THESE MADE IT FOR YOU TO DO YOUR WORK, TAKE CARE OF THINGS AT HOME, OR GET ALONG WITH OTHER PEOPLE?: SOMEWHAT DIFFICULT
1. FEELING NERVOUS, ANXIOUS, OR ON EDGE: SEVERAL DAYS
3. WORRYING TOO MUCH ABOUT DIFFERENT THINGS: SEVERAL DAYS

## 2024-07-11 NOTE — PROGRESS NOTES
Assessment & Plan     Fatigue, unspecified type  Rule out fatigue correlated with physical process.  Plan for sertraline 25 mg for 1 week and increase to 50 mg in the second week.  Plan to follow-up in office in 1 month.     - TSH with free T4 reflex; Future  - Comprehensive metabolic panel; Future  - Hemoglobin A1c; Future  - sertraline (ZOLOFT) 50 MG tablet; Take 1 tablet (50 mg) by mouth daily    Dysthymia  Upon assessment patient stated that his symptoms of fatigue and energy have been going on for 1.5 years.  Discussed with patient different pathways for fatigue including thyroid, depression, anxiety, obstructive sleep apnea, uncontrolled asthma, to name a few.  Patient states that he has been feeling symptoms of depression and anxiety for the last year and a half.  States that he has difficulty falling asleep at night but unable to turn off the thoughts in his head.  Patient has withdrawn from support systems and ability to talk about his symptoms.  Patient was unable to notice the symptoms until prednisone and giving him energy.    Plan to rule out physical causes correlated with fatigue and depression including labs listed.  Discussed antianxiety antidepression medication with the patient or patient was willing to try.  Discussed side effects and medication dosage with the patient and plan to follow-up in 1 month.  Patient was encouraged to reach out to us with any questions or concerns including side effects.     - TSH with free T4 reflex; Future  - Comprehensive metabolic panel; Future  - Hemoglobin A1c; Future  - sertraline (ZOLOFT) 50 MG tablet; Take 1 tablet (50 mg) by mouth daily      The longitudinal plan of care for the diagnosis(es)/condition(s) as documented were addressed during this visit. Due to the added complexity in care, I will continue to support Bharat in the subsequent management and with ongoing continuity of care.      BMI  Estimated body mass index is 46.89 kg/m  as calculated from the  "following:    Height as of this encounter: 1.753 m (5' 9\").    Weight as of this encounter: 144 kg (317 lb 8 oz).             Subjective   Bharat is a 58 year old, presenting for the following health issues:  Follow Up (C/o feeling sluggish--please refer to RN triage note from 06/28/2024)      7/11/2024    11:07 AM   Additional Questions   Roomed by Michelle DUNCAN CMA   Accompanied by Self     History of Present Illness       Back Pain:  He presents for follow up of back pain. Patient's back pain is a recurring problem.  Location of back pain:  Other  Description of back pain: dull ache  Back pain spreads: nowhere    Since patient first noticed back pain, pain is: always present, but gets better and worse  Does back pain interfere with his job:  No       Reason for visit:  Low energy level  / tired    He eats 0-1 servings of fruits and vegetables daily.He consumes 2 sweetened beverage(s) daily.He exercises with enough effort to increase his heart rate 9 or less minutes per day.  He exercises with enough effort to increase his heart rate 3 or less days per week.   He is taking medications regularly.     Bharat is a 58-year-old male that comes in to the clinic for concern of feeling sluggish and fatigued.  Patient states that he was recently on prednisone for bronchitis and pneumonia and noticed increased energy while being on prednisone.  Patient stated after being off of prednisone he noticed decreased energy and fatigue.  Patient states that he notices the decreased energy and fatigue every day, with focus to the symptoms being while at work and when he is trying to fall asleep.  States he has difficulty getting up to do activities pain in his body while doing activities and difficulty falling asleep at night.  Patient denies any situations that make his fatigue symptoms better.  Patient endorses his fatigue symptoms feeling worse with decreased sleep.                Review of Systems   Constitutional:  Positive for activity " "change and fatigue. Negative for appetite change and unexpected weight change.   HENT: Negative.     Eyes: Negative.    Respiratory:  Positive for apnea. Negative for shortness of breath and wheezing.    Cardiovascular: Negative.    Gastrointestinal: Negative.    Endocrine: Negative for cold intolerance, heat intolerance and polydipsia.   Genitourinary: Negative.    Musculoskeletal:  Positive for back pain and myalgias. Negative for arthralgias and joint swelling.   Skin: Negative.    Allergic/Immunologic: Negative.    Neurological:  Negative for syncope, weakness and light-headedness.   Psychiatric/Behavioral:  Positive for sleep disturbance. Negative for self-injury and suicidal ideas. The patient is nervous/anxious.           Objective    BP (!) 143/96 (BP Location: Right arm, Patient Position: Sitting, Cuff Size: Adult Large)   Pulse 72   Temp (!) 96.6  F (35.9  C) (Tympanic)   Resp 20   Ht 1.753 m (5' 9\")   Wt 144 kg (317 lb 8 oz)   SpO2 97%   BMI 46.89 kg/m    Body mass index is 46.89 kg/m .  Physical Exam   GENERAL: alert and no distress  EYES: Eyes grossly normal to inspection, PERRL and conjunctivae and sclerae normal  HENT: normal cephalic/atraumatic, both ears: occluded with wax, nose and mouth without ulcers or lesions, oropharynx clear, and oral mucous membranes moist  NECK: no adenopathy, no asymmetry, masses, or scars  RESP: lungs clear to auscultation - no rales, rhonchi or wheezes  CV: regular rate and rhythm, normal S1 S2, no S3 or S4, no murmur, click or rub, no peripheral edema  ABDOMEN: soft, nontender, no hepatosplenomegaly, no masses and bowel sounds normal  MS: no gross musculoskeletal defects noted, no edema  PSYCH: mentation appears normal and affect flat            Iris L. Alberto, RN [facility] student on 7/11/2024 at 12:12 PM    Physician Attestation   I, Topher Varma MD, was present with the medical/ROSLYN student who participated in the service and in the documentation of the " note.  I have verified the history and personally performed the physical exam and medical decision making.  I agree with the assessment and plan of care as documented in the note.          Topher Varma MD   Signed Electronically by: Topher Varma MD

## 2024-08-20 ENCOUNTER — OFFICE VISIT (OUTPATIENT)
Dept: FAMILY MEDICINE | Facility: CLINIC | Age: 59
End: 2024-08-20
Payer: COMMERCIAL

## 2024-08-20 VITALS
HEART RATE: 68 BPM | TEMPERATURE: 97.1 F | OXYGEN SATURATION: 95 % | DIASTOLIC BLOOD PRESSURE: 89 MMHG | RESPIRATION RATE: 16 BRPM | HEIGHT: 69 IN | SYSTOLIC BLOOD PRESSURE: 134 MMHG | WEIGHT: 315 LBS | BODY MASS INDEX: 46.65 KG/M2

## 2024-08-20 DIAGNOSIS — M79.10 MUSCLE PAIN: ICD-10-CM

## 2024-08-20 DIAGNOSIS — Z12.11 COLON CANCER SCREENING: ICD-10-CM

## 2024-08-20 DIAGNOSIS — F34.1 DYSTHYMIA: Primary | ICD-10-CM

## 2024-08-20 LAB
CK SERPL-CCNC: 141 U/L (ref 39–308)
ERYTHROCYTE [DISTWIDTH] IN BLOOD BY AUTOMATED COUNT: 14.1 % (ref 10–15)
ERYTHROCYTE [SEDIMENTATION RATE] IN BLOOD BY WESTERGREN METHOD: 4 MM/HR (ref 0–20)
HCT VFR BLD AUTO: 45.7 % (ref 40–53)
HGB BLD-MCNC: 14.9 G/DL (ref 13.3–17.7)
MCH RBC QN AUTO: 27.7 PG (ref 26.5–33)
MCHC RBC AUTO-ENTMCNC: 32.6 G/DL (ref 31.5–36.5)
MCV RBC AUTO: 85 FL (ref 78–100)
PLATELET # BLD AUTO: 233 10E3/UL (ref 150–450)
RBC # BLD AUTO: 5.38 10E6/UL (ref 4.4–5.9)
WBC # BLD AUTO: 9.9 10E3/UL (ref 4–11)

## 2024-08-20 PROCEDURE — 85027 COMPLETE CBC AUTOMATED: CPT | Performed by: FAMILY MEDICINE

## 2024-08-20 PROCEDURE — 85652 RBC SED RATE AUTOMATED: CPT | Performed by: FAMILY MEDICINE

## 2024-08-20 PROCEDURE — 82550 ASSAY OF CK (CPK): CPT | Performed by: FAMILY MEDICINE

## 2024-08-20 PROCEDURE — 99214 OFFICE O/P EST MOD 30 MIN: CPT | Performed by: FAMILY MEDICINE

## 2024-08-20 PROCEDURE — 36415 COLL VENOUS BLD VENIPUNCTURE: CPT | Performed by: FAMILY MEDICINE

## 2024-08-20 RX ORDER — BUPROPION HYDROCHLORIDE 150 MG/1
150 TABLET ORAL EVERY MORNING
Qty: 30 TABLET | Refills: 2 | Status: SHIPPED | OUTPATIENT
Start: 2024-08-20

## 2024-08-20 ASSESSMENT — ASTHMA QUESTIONNAIRES
QUESTION_3 LAST FOUR WEEKS HOW OFTEN DID YOUR ASTHMA SYMPTOMS (WHEEZING, COUGHING, SHORTNESS OF BREATH, CHEST TIGHTNESS OR PAIN) WAKE YOU UP AT NIGHT OR EARLIER THAN USUAL IN THE MORNING: NOT AT ALL
QUESTION_4 LAST FOUR WEEKS HOW OFTEN HAVE YOU USED YOUR RESCUE INHALER OR NEBULIZER MEDICATION (SUCH AS ALBUTEROL): NOT AT ALL
ACT_TOTALSCORE: 25
QUESTION_5 LAST FOUR WEEKS HOW WOULD YOU RATE YOUR ASTHMA CONTROL: COMPLETELY CONTROLLED
QUESTION_2 LAST FOUR WEEKS HOW OFTEN HAVE YOU HAD SHORTNESS OF BREATH: NOT AT ALL
ACT_TOTALSCORE: 25
QUESTION_1 LAST FOUR WEEKS HOW MUCH OF THE TIME DID YOUR ASTHMA KEEP YOU FROM GETTING AS MUCH DONE AT WORK, SCHOOL OR AT HOME: NONE OF THE TIME

## 2024-08-20 ASSESSMENT — PATIENT HEALTH QUESTIONNAIRE - PHQ9
SUM OF ALL RESPONSES TO PHQ QUESTIONS 1-9: 4
SUM OF ALL RESPONSES TO PHQ QUESTIONS 1-9: 4
10. IF YOU CHECKED OFF ANY PROBLEMS, HOW DIFFICULT HAVE THESE PROBLEMS MADE IT FOR YOU TO DO YOUR WORK, TAKE CARE OF THINGS AT HOME, OR GET ALONG WITH OTHER PEOPLE: SOMEWHAT DIFFICULT

## 2024-08-20 NOTE — PROGRESS NOTES
"  Assessment & Plan     Dysthymia  He did not experience much relief with sertraline.  Trial of bupropion and follow-up in 1 months.  - buPROPion (WELLBUTRIN XL) 150 MG 24 hr tablet; Take 1 tablet (150 mg) by mouth every morning    Muscle pain  Patient reports ongoing muscle pain and mild proximal weakness mainly in the legs.  Laboratory evaluation as below and will follow-up when results are available.  - Erythrocyte sedimentation rate auto; Future  - CBC with platelets; Future  - CK total; Future  l    Colon cancer screening  Referral sent to [facility]  - Colonoscopy Screening  Referral; Future          BMI  Estimated body mass index is 46.8 kg/m  as calculated from the following:    Height as of this encounter: 1.753 m (5' 9\").    Weight as of this encounter: 143.7 kg (316 lb 14.4 oz).             Jose G Nicholson is a 58 year old, presenting for the following health issues:  Musculoskeletal Problem (C/o numbness to fingertips on/off), Asthma (Asthma follow up--doesn't use inhalers muchh), and Depression (Depression follow up, discuss medication.   Hasn't taken for awhile--ran out)      8/20/2024     1:53 PM   Additional Questions   Roomed by Michelle DUNCAN CMA   Accompanied by self     Musculoskeletal Problem    History of Present Illness       Reason for visit:  Follow up on meds and numbness in fingers at times    He eats 0-1 servings of fruits and vegetables daily.He consumes 2 sweetened beverage(s) daily.He exercises with enough effort to increase his heart rate 9 or less minutes per day.  He exercises with enough effort to increase his heart rate 3 or less days per week.   He is taking medications regularly.     Patient is here with ongoing trouble with pain and weakness in his proximal lower extremity.  He finds it difficult sometimes to go up and down stairs.  This was much better when he was on steroids for his respiratory condition.  He stopped taking sertraline that was prescribed at " "his last visit.  He did not think it made much difference in his mood or his sleep problems.              Objective    /89 (BP Location: Right arm, Patient Position: Sitting, Cuff Size: Adult Large)   Pulse 68   Temp 97.1  F (36.2  C) (Tympanic)   Resp 16   Ht 1.753 m (5' 9\")   Wt 143.7 kg (316 lb 14.4 oz)   SpO2 95%   BMI 46.80 kg/m    Body mass index is 46.8 kg/m .  Physical Exam   Alert, oriented, no acute distress  Neck supple without adenopathy  Lungs are clear  Heart has a regular rate rhythm  No muscular tenderness in the lower extremities, strength is normal            Signed Electronically by: Topher Varma MD    "

## 2024-10-05 ENCOUNTER — HEALTH MAINTENANCE LETTER (OUTPATIENT)
Age: 59
End: 2024-10-05